# Patient Record
Sex: FEMALE | Race: BLACK OR AFRICAN AMERICAN | NOT HISPANIC OR LATINO | Employment: PART TIME | URBAN - METROPOLITAN AREA
[De-identification: names, ages, dates, MRNs, and addresses within clinical notes are randomized per-mention and may not be internally consistent; named-entity substitution may affect disease eponyms.]

---

## 2023-08-01 ENCOUNTER — HOSPITAL ENCOUNTER (OUTPATIENT)
Dept: DATA CONVERSION | Facility: HOSPITAL | Age: 58
End: 2023-08-01

## 2023-08-01 DIAGNOSIS — I20.1 ANGINA PECTORIS WITH DOCUMENTED SPASM (CMS-HCC): ICD-10-CM

## 2023-08-03 ENCOUNTER — HOSPITAL ENCOUNTER (OUTPATIENT)
Dept: DATA CONVERSION | Facility: HOSPITAL | Age: 58
End: 2023-08-03

## 2023-08-21 DIAGNOSIS — R07.89 OTHER CHEST PAIN: ICD-10-CM

## 2023-08-23 ENCOUNTER — HOSPITAL ENCOUNTER (OUTPATIENT)
Dept: DATA CONVERSION | Facility: HOSPITAL | Age: 58
End: 2023-08-23

## 2023-08-23 DIAGNOSIS — G47.33 OBSTRUCTIVE SLEEP APNEA (ADULT) (PEDIATRIC): ICD-10-CM

## 2023-08-31 ENCOUNTER — HOSPITAL ENCOUNTER (OUTPATIENT)
Dept: DATA CONVERSION | Facility: HOSPITAL | Age: 58
Discharge: HOME | End: 2023-08-31
Payer: COMMERCIAL

## 2023-08-31 DIAGNOSIS — R07.89 OTHER CHEST PAIN: ICD-10-CM

## 2023-09-23 PROBLEM — R00.2 PALPITATIONS: Status: ACTIVE | Noted: 2023-09-23

## 2023-09-23 PROBLEM — I10 HYPERTENSIVE DISORDER: Status: ACTIVE | Noted: 2023-09-23

## 2023-09-23 PROBLEM — J45.909 ASTHMA (HHS-HCC): Status: ACTIVE | Noted: 2023-09-23

## 2023-09-23 PROBLEM — R30.0 DYSURIA: Status: ACTIVE | Noted: 2023-09-23

## 2023-09-23 PROBLEM — T50.901A ACCIDENTAL DRUG OVERDOSE: Status: ACTIVE | Noted: 2023-09-23

## 2023-09-23 PROBLEM — R06.00 DYSPNEA: Status: ACTIVE | Noted: 2023-09-23

## 2023-09-23 PROBLEM — K57.92 DIVERTICULITIS: Status: ACTIVE | Noted: 2023-09-23

## 2023-09-23 PROBLEM — K29.70 GASTRITIS: Status: ACTIVE | Noted: 2023-09-23

## 2023-09-23 PROBLEM — R07.89 ATYPICAL CHEST PAIN: Status: ACTIVE | Noted: 2023-09-23

## 2023-09-23 PROBLEM — I20.1 VARIANT ANGINA (CMS-HCC): Status: ACTIVE | Noted: 2023-09-23

## 2023-09-23 PROBLEM — I10 ESSENTIAL HYPERTENSION: Status: ACTIVE | Noted: 2023-09-23

## 2023-09-23 PROBLEM — N95.2 ATROPHIC VAGINITIS: Status: ACTIVE | Noted: 2023-09-23

## 2023-09-23 PROBLEM — I48.0 PAROXYSMAL ATRIAL FIBRILLATION (MULTI): Status: ACTIVE | Noted: 2023-09-23

## 2023-09-23 RX ORDER — METOPROLOL SUCCINATE 100 MG/1
100 TABLET, EXTENDED RELEASE ORAL
COMMUNITY
Start: 2023-08-15 | End: 2023-12-13

## 2023-09-23 RX ORDER — FLECAINIDE ACETATE 50 MG/1
50 TABLET ORAL 2 TIMES DAILY
COMMUNITY
Start: 2023-08-15

## 2023-09-23 RX ORDER — FAMOTIDINE 20 MG/1
20 TABLET, FILM COATED ORAL 2 TIMES DAILY
COMMUNITY
Start: 2018-02-08

## 2023-09-23 RX ORDER — AMLODIPINE BESYLATE 5 MG/1
5 TABLET ORAL
COMMUNITY
Start: 2023-08-15 | End: 2023-12-13

## 2023-09-23 RX ORDER — ALBUTEROL SULFATE 90 UG/1
2 AEROSOL, METERED RESPIRATORY (INHALATION) EVERY 4 HOURS PRN
COMMUNITY
Start: 2023-08-15 | End: 2024-05-30 | Stop reason: ALTCHOICE

## 2023-09-23 RX ORDER — GABAPENTIN 300 MG/1
300 CAPSULE ORAL NIGHTLY
COMMUNITY
Start: 2023-08-15 | End: 2023-12-13

## 2023-09-23 RX ORDER — CYCLOBENZAPRINE HCL 10 MG
10 TABLET ORAL 3 TIMES DAILY PRN
COMMUNITY
Start: 2020-01-21

## 2023-09-23 RX ORDER — FLUTICASONE PROPIONATE 50 MCG
2 SPRAY, SUSPENSION (ML) NASAL DAILY
COMMUNITY
Start: 2023-08-15

## 2023-09-23 RX ORDER — HYDROCHLOROTHIAZIDE 12.5 MG/1
12.5 TABLET ORAL
COMMUNITY
Start: 2023-08-15 | End: 2023-11-13

## 2023-09-23 RX ORDER — TRAMADOL HYDROCHLORIDE 50 MG/1
50 TABLET ORAL EVERY 4 HOURS PRN
COMMUNITY

## 2023-09-23 RX ORDER — HYDROCHLOROTHIAZIDE 25 MG/1
1 TABLET ORAL EVERY MORNING
COMMUNITY

## 2023-09-23 RX ORDER — METOPROLOL TARTRATE 50 MG/1
50 TABLET ORAL 2 TIMES DAILY
COMMUNITY

## 2023-09-23 RX ORDER — DIPHENHYDRAMINE HCL 25 MG
1-2 CAPSULE ORAL 3 TIMES DAILY PRN
COMMUNITY
Start: 2018-02-08

## 2023-09-23 RX ORDER — VIT C/E/ZN/COPPR/LUTEIN/ZEAXAN 250MG-90MG
2000 CAPSULE ORAL
COMMUNITY
Start: 2023-08-15

## 2023-09-23 RX ORDER — METHOCARBAMOL 500 MG/1
500 TABLET, FILM COATED ORAL 2 TIMES DAILY PRN
COMMUNITY
Start: 2023-08-15

## 2024-01-03 ENCOUNTER — PHARMACY VISIT (OUTPATIENT)
Dept: PHARMACY | Facility: CLINIC | Age: 59
End: 2024-01-03
Payer: MEDICAID

## 2024-01-03 ENCOUNTER — APPOINTMENT (OUTPATIENT)
Dept: RADIOLOGY | Facility: HOSPITAL | Age: 59
End: 2024-01-03
Payer: MEDICAID

## 2024-01-03 ENCOUNTER — HOSPITAL ENCOUNTER (EMERGENCY)
Facility: HOSPITAL | Age: 59
Discharge: HOME | End: 2024-01-03
Attending: STUDENT IN AN ORGANIZED HEALTH CARE EDUCATION/TRAINING PROGRAM
Payer: MEDICAID

## 2024-01-03 ENCOUNTER — APPOINTMENT (OUTPATIENT)
Dept: CARDIOLOGY | Facility: HOSPITAL | Age: 59
End: 2024-01-03
Payer: MEDICAID

## 2024-01-03 VITALS
OXYGEN SATURATION: 100 % | DIASTOLIC BLOOD PRESSURE: 73 MMHG | HEIGHT: 71 IN | SYSTOLIC BLOOD PRESSURE: 161 MMHG | HEART RATE: 74 BPM | WEIGHT: 240.74 LBS | TEMPERATURE: 97.9 F | RESPIRATION RATE: 14 BRPM | BODY MASS INDEX: 33.7 KG/M2

## 2024-01-03 DIAGNOSIS — R09.81 NASAL CONGESTION: ICD-10-CM

## 2024-01-03 DIAGNOSIS — J32.9 VIRAL SINUSITIS: Primary | ICD-10-CM

## 2024-01-03 DIAGNOSIS — B97.89 VIRAL SINUSITIS: Primary | ICD-10-CM

## 2024-01-03 LAB
FLUAV RNA RESP QL NAA+PROBE: NOT DETECTED
FLUBV RNA RESP QL NAA+PROBE: NOT DETECTED
RSV RNA RESP QL NAA+PROBE: NOT DETECTED
SARS-COV-2 RNA RESP QL NAA+PROBE: NOT DETECTED

## 2024-01-03 PROCEDURE — 87637 SARSCOV2&INF A&B&RSV AMP PRB: CPT | Performed by: EMERGENCY MEDICINE

## 2024-01-03 PROCEDURE — 71046 X-RAY EXAM CHEST 2 VIEWS: CPT

## 2024-01-03 PROCEDURE — RXMED WILLOW AMBULATORY MEDICATION CHARGE

## 2024-01-03 PROCEDURE — 93005 ELECTROCARDIOGRAM TRACING: CPT

## 2024-01-03 PROCEDURE — 99283 EMERGENCY DEPT VISIT LOW MDM: CPT | Performed by: STUDENT IN AN ORGANIZED HEALTH CARE EDUCATION/TRAINING PROGRAM

## 2024-01-03 RX ORDER — GUAIFENESIN 600 MG/1
TABLET, EXTENDED RELEASE ORAL
Qty: 30 TABLET | Refills: 0 | OUTPATIENT
Start: 2024-01-03

## 2024-01-03 RX ORDER — GUAIFENESIN AND DEXTROMETHORPHAN HYDROBROMIDE 600; 30 MG/1; MG/1
TABLET, EXTENDED RELEASE ORAL
Qty: 30 TABLET | Refills: 0 | OUTPATIENT
Start: 2024-01-03 | End: 2024-01-03 | Stop reason: ENTERED-IN-ERROR

## 2024-01-03 RX ORDER — GUAIFENESIN 600 MG/1
1200 TABLET, EXTENDED RELEASE ORAL 2 TIMES DAILY
Qty: 30 TABLET | Refills: 0 | Status: SHIPPED | OUTPATIENT
Start: 2024-01-03 | End: 2024-01-11

## 2024-01-03 ASSESSMENT — COLUMBIA-SUICIDE SEVERITY RATING SCALE - C-SSRS
6. HAVE YOU EVER DONE ANYTHING, STARTED TO DO ANYTHING, OR PREPARED TO DO ANYTHING TO END YOUR LIFE?: NO
1. IN THE PAST MONTH, HAVE YOU WISHED YOU WERE DEAD OR WISHED YOU COULD GO TO SLEEP AND NOT WAKE UP?: NO
2. HAVE YOU ACTUALLY HAD ANY THOUGHTS OF KILLING YOURSELF?: NO

## 2024-01-03 ASSESSMENT — PAIN - FUNCTIONAL ASSESSMENT: PAIN_FUNCTIONAL_ASSESSMENT: 0-10

## 2024-01-03 NOTE — DISCHARGE INSTRUCTIONS
Take over-the-counter medications as needed for symptom relief.  Use Mucinex as prescribed.  Do not take multiple decongestants together, as this may make congestion worse.

## 2024-01-03 NOTE — Clinical Note
Lili Doe was seen and treated in our emergency department on 1/3/2024.  She may return to work on 01/08/2024.  Patient presented to the emergency department today with illness.  I believe rest and hydration will help this patient's symptoms and a return to work on Monday 1/8 would be acceptable.     If you have any questions or concerns, please don't hesitate to call.      Ron Vera PA-C

## 2024-01-03 NOTE — ED PROVIDER NOTES
HPI   Chief Complaint   Patient presents with    Flu Symptoms     Flu sx since Thursday.        Patient is a 58-year-old female past medical history of atrial fibrillation, asthma, hypertension presenting with cold-like symptoms.  Symptoms started last Thursday.  She endorses head congestion, nasal congestion, mild intermittent cough that is not productive.  Patient endorses musculoskeletal chest pain from the cough as well as mild shortness of breath during coughing fits.  Patient denies having taken any over-the-counter medication to help with her symptoms.  She does endorse having a sick child currently as well as sick contacts at work.  She denies fever, chills, abdominal pain, nausea, vomiting, or diarrhea.                           [place] Coma Scale Score: 15                  Patient History   Past Medical History:   Diagnosis Date    Asthma     Atrial fibrillation (CMS/[facility])     Hypertension      No past surgical history on file.  Family History   Problem Relation Name Age of Onset    Stroke Mother      No Known Problems Father       Social History     Tobacco Use    Smoking status: Not on file    Smokeless tobacco: Not on file   Substance Use Topics    Alcohol use: Not on file    Drug use: Not on file       Physical Exam   ED Triage Vitals [01/03/24 1115]   Temp Heart Rate Resp BP   36.6 °C (97.9 °F) 74 14 161/73      SpO2 Temp src Heart Rate Source Patient Position   100 % -- -- --      BP Location FiO2 (%)     -- --       Physical Exam  Constitutional:       Appearance: Normal appearance.      Comments: Patient is sitting in chair, in no acute distress   HENT:      Head: Normocephalic and atraumatic.      Comments: Nontender to palpation of frontal and maxillary sinuses     Nose: Congestion present.      Mouth/Throat:      Mouth: Mucous membranes are moist.      Pharynx: Oropharynx is clear.   Eyes:      Extraocular Movements: Extraocular movements intact.      Pupils: Pupils are equal, round, and reactive  to light.   Cardiovascular:      Rate and Rhythm: Normal rate and regular rhythm.      Pulses: Normal pulses.      Heart sounds: Normal heart sounds.   Pulmonary:      Effort: Pulmonary effort is normal.      Breath sounds: Normal breath sounds.   Abdominal:      General: Abdomen is flat.      Palpations: Abdomen is soft.   Musculoskeletal:      Cervical back: Normal range of motion and neck supple.   Skin:     General: Skin is warm and dry.   Neurological:      General: No focal deficit present.      Mental Status: She is alert and oriented to person, place, and time.   Psychiatric:         Mood and Affect: Mood normal.         Behavior: Behavior normal.         ED Course & MDM   ED Course as of 01/03/24 1316   Wed Jan 03, 2024   1129 NSR, rate 69, no ectopy, no stttw changes, no stemi [JH]      ED Course User Index  [JH] Omar Campbell MD         Diagnoses as of 01/03/24 1316   Viral sinusitis   Nasal congestion       Medical Decision Making  Patient is a 58-year-old female with past medical history of hypertension, atrial fibrillation, asthma presenting with cold-like symptoms.  Conditions considered include but are not limited to: COVID, influenza, RSV, sinusitis, other upper respiratory infection, pneumonia, PE.  Due to patient's chief complaint, COVID/influenza/RSV swabs ordered.  Chest x-ray ordered.  COVID/influenza/RSV swabs are all negative.  Chest x-ray shows no acute cardiopulmonary process.  I believe this patient is at low risk for complication, and a disposition of discharge is acceptable.  We discussed returning to the emergency department if new or worsening symptoms including intractable fever, chest pain, shortness of breath, abdominal pain, nausea, vomiting, diarrhea.  Patient states that she has taken Mucinex in the past which helps to clear her congestion.  A prescription for Mucinex was sent.  We discussed over-the-counter antipyretics and pain relief as needed.  Also discussed taking  decongestants however do not mix decongestants with Mucinex as this may worsen congestion.  Patient agreeable to discharge with a follow-up with her primary care as well as discharge with prescription for Mucinex.    Portions of this note made with Dragon software, please be mindful of potential grammatical errors.        Labs Reviewed   SARS-COV-2 AND INFLUENZA A/B PCR - Normal       Result Value    Flu A Result Not Detected      Flu B Result Not Detected      Coronavirus 2019, PCR Not Detected      Narrative:     This assay has received FDA Emergency Use Authorization (EUA) and  is only authorized for the duration of time that circumstances exist to justify the authorization of the emergency use of in vitro diagnostic tests for the detection of SARS-CoV-2 virus and/or diagnosis of COVID-19 infection under section 564(b)(1) of the Act, 21 U.S.C. 360bbb-3(b)(1). Testing for SARS-CoV-2 is only recommended for patients who meet current clinical and/or epidemiological criteria as defined by federal, state, or local public health directives. This assay is an in vitro diagnostic nucleic acid amplification test for the qualitative detection of SARS-CoV-2, Influenza A, and Influenza B from nasopharyngeal specimens and has been validated for use at [facility]. Negative results do not preclude COVID-19 infections or Influenza A/B infections, and should not be used as the sole basis for diagnosis, treatment, or other management decisions. If Influenza A/B and RSV PCR results are negative, testing for Parainfluenza virus, Adenovirus and Metapneumovirus is routinely performed for [facility] pediatric oncology and intensive care inpatients, and is available on other patients by placing an add-on request.    RSV PCR - Normal    RSV PCR Not Detected      Narrative:     This assay is an FDA-cleared, in vitro diagnostic nucleic acid amplification test for the detection of RSV from nasopharyngeal specimens, and has  been validated for use at [facility]. Negative results do not preclude RSV infections, and should not be used as the sole basis for diagnosis, treatment, or other management decisions. If Influenza A/B and RSV PCR results are negative, testing for Parainfluenza virus, Adenovirus and Metapneumovirus is routinely performed for pediatric oncology and intensive care inpatients at [facility], and is available on other patients by placing an add-on request.             XR chest 2 views   Final Result   No acute cardiopulmonary process.                  Signed by: Louis Aldana 1/3/2024 11:57 AM   Dictation workstation:   [id number]            Procedure  Procedures     Ron Vera PA-C  01/03/24 3288

## 2024-01-04 PROCEDURE — RXMED WILLOW AMBULATORY MEDICATION CHARGE

## 2024-01-07 LAB
ATRIAL RATE: 69 BPM
P AXIS: 74 DEGREES
P OFFSET: 198 MS
P ONSET: 134 MS
PR INTERVAL: 170 MS
Q ONSET: 219 MS
QRS COUNT: 11 BEATS
QRS DURATION: 82 MS
QT INTERVAL: 394 MS
QTC CALCULATION(BAZETT): 422 MS
QTC FREDERICIA: 413 MS
R AXIS: 52 DEGREES
T AXIS: 57 DEGREES
T OFFSET: 416 MS
VENTRICULAR RATE: 69 BPM

## 2024-01-12 PROCEDURE — RXMED WILLOW AMBULATORY MEDICATION CHARGE

## 2024-01-15 ENCOUNTER — PHARMACY VISIT (OUTPATIENT)
Dept: PHARMACY | Facility: CLINIC | Age: 59
End: 2024-01-15
Payer: MEDICAID

## 2024-01-19 PROCEDURE — RXMED WILLOW AMBULATORY MEDICATION CHARGE

## 2024-01-22 ENCOUNTER — PHARMACY VISIT (OUTPATIENT)
Dept: PHARMACY | Facility: CLINIC | Age: 59
End: 2024-01-22
Payer: MEDICAID

## 2024-02-08 PROCEDURE — RXMED WILLOW AMBULATORY MEDICATION CHARGE

## 2024-02-14 ENCOUNTER — PHARMACY VISIT (OUTPATIENT)
Dept: PHARMACY | Facility: CLINIC | Age: 59
End: 2024-02-14
Payer: MEDICAID

## 2024-02-15 PROCEDURE — RXMED WILLOW AMBULATORY MEDICATION CHARGE

## 2024-02-23 ENCOUNTER — PHARMACY VISIT (OUTPATIENT)
Dept: PHARMACY | Facility: CLINIC | Age: 59
End: 2024-02-23
Payer: MEDICAID

## 2024-02-29 PROCEDURE — RXMED WILLOW AMBULATORY MEDICATION CHARGE

## 2024-03-01 ENCOUNTER — PHARMACY VISIT (OUTPATIENT)
Dept: PHARMACY | Facility: CLINIC | Age: 59
End: 2024-03-01
Payer: MEDICAID

## 2024-03-17 RX ORDER — GABAPENTIN 300 MG/1
300 CAPSULE ORAL NIGHTLY
Qty: 90 CAPSULE | Refills: 0 | Status: CANCELLED | OUTPATIENT
Start: 2024-03-17

## 2024-03-18 PROCEDURE — RXMED WILLOW AMBULATORY MEDICATION CHARGE

## 2024-03-19 PROCEDURE — RXMED WILLOW AMBULATORY MEDICATION CHARGE

## 2024-03-25 ENCOUNTER — PHARMACY VISIT (OUTPATIENT)
Dept: PHARMACY | Facility: CLINIC | Age: 59
End: 2024-03-25
Payer: MEDICAID

## 2024-03-26 ENCOUNTER — APPOINTMENT (OUTPATIENT)
Dept: RADIOLOGY | Facility: HOSPITAL | Age: 59
End: 2024-03-26
Payer: MEDICAID

## 2024-03-26 ENCOUNTER — HOSPITAL ENCOUNTER (EMERGENCY)
Facility: HOSPITAL | Age: 59
Discharge: HOME | End: 2024-03-26
Payer: MEDICAID

## 2024-03-26 VITALS
HEIGHT: 71 IN | DIASTOLIC BLOOD PRESSURE: 74 MMHG | TEMPERATURE: 97.7 F | SYSTOLIC BLOOD PRESSURE: 155 MMHG | RESPIRATION RATE: 18 BRPM | OXYGEN SATURATION: 99 % | WEIGHT: 230 LBS | HEART RATE: 77 BPM | BODY MASS INDEX: 32.2 KG/M2

## 2024-03-26 DIAGNOSIS — S16.1XXA CERVICAL STRAIN, ACUTE, INITIAL ENCOUNTER: Primary | ICD-10-CM

## 2024-03-26 PROCEDURE — 96372 THER/PROPH/DIAG INJ SC/IM: CPT

## 2024-03-26 PROCEDURE — 72125 CT NECK SPINE W/O DYE: CPT

## 2024-03-26 PROCEDURE — 2500000004 HC RX 250 GENERAL PHARMACY W/ HCPCS (ALT 636 FOR OP/ED)

## 2024-03-26 PROCEDURE — 2500000005 HC RX 250 GENERAL PHARMACY W/O HCPCS

## 2024-03-26 PROCEDURE — 99284 EMERGENCY DEPT VISIT MOD MDM: CPT | Mod: 25

## 2024-03-26 PROCEDURE — 72125 CT NECK SPINE W/O DYE: CPT | Performed by: RADIOLOGY

## 2024-03-26 RX ORDER — ACETAMINOPHEN 325 MG/1
650 TABLET ORAL ONCE
Status: COMPLETED | OUTPATIENT
Start: 2024-03-26 | End: 2024-03-26

## 2024-03-26 RX ORDER — LIDOCAINE 560 MG/1
1 PATCH PERCUTANEOUS; TOPICAL; TRANSDERMAL ONCE
Status: DISCONTINUED | OUTPATIENT
Start: 2024-03-26 | End: 2024-03-26 | Stop reason: HOSPADM

## 2024-03-26 RX ORDER — LIDOCAINE 50 MG/G
1 PATCH TOPICAL DAILY
Qty: 21 PATCH | Refills: 0 | Status: SHIPPED | OUTPATIENT
Start: 2024-03-26

## 2024-03-26 RX ORDER — CYCLOBENZAPRINE HCL 10 MG
10 TABLET ORAL 2 TIMES DAILY PRN
Qty: 14 TABLET | Refills: 0 | Status: SHIPPED | OUTPATIENT
Start: 2024-03-26 | End: 2024-04-05

## 2024-03-26 RX ORDER — KETOROLAC TROMETHAMINE 30 MG/ML
15 INJECTION, SOLUTION INTRAMUSCULAR; INTRAVENOUS ONCE
Status: COMPLETED | OUTPATIENT
Start: 2024-03-26 | End: 2024-03-26

## 2024-03-26 RX ORDER — ORPHENADRINE CITRATE 30 MG/ML
60 INJECTION INTRAMUSCULAR; INTRAVENOUS ONCE
Status: COMPLETED | OUTPATIENT
Start: 2024-03-26 | End: 2024-03-26

## 2024-03-26 RX ADMIN — KETOROLAC TROMETHAMINE 15 MG: 30 INJECTION, SOLUTION INTRAMUSCULAR at 18:44

## 2024-03-26 RX ADMIN — LIDOCAINE 1 PATCH: 4 PATCH TOPICAL at 18:44

## 2024-03-26 RX ADMIN — ACETAMINOPHEN 650 MG: 325 TABLET ORAL at 18:45

## 2024-03-26 RX ADMIN — ORPHENADRINE CITRATE 60 MG: 60 INJECTION INTRAMUSCULAR; INTRAVENOUS at 18:44

## 2024-03-26 ASSESSMENT — COLUMBIA-SUICIDE SEVERITY RATING SCALE - C-SSRS
2. HAVE YOU ACTUALLY HAD ANY THOUGHTS OF KILLING YOURSELF?: NO
1. IN THE PAST MONTH, HAVE YOU WISHED YOU WERE DEAD OR WISHED YOU COULD GO TO SLEEP AND NOT WAKE UP?: NO
6. HAVE YOU EVER DONE ANYTHING, STARTED TO DO ANYTHING, OR PREPARED TO DO ANYTHING TO END YOUR LIFE?: NO

## 2024-03-26 NOTE — ED PROVIDER NOTES
HPI   Chief Complaint   Patient presents with    Neck Pain     Pt complains of neck pain that started yesterday. No trauma       Patient is a 58-year-old female presents emergency department for evaluation of right-sided neck pain.  Patient states that she has a history of neck issues that she has a lot of degenerative disease and pain in the neck.  She states that she follows with her primary care provider for this has never seen orthopedic spine specialist.  She states that yesterday she was having some pain on the left side of her neck and she took some cyclobenzaprine and Tylenol which seemed to help.  She states that today when she woke up this morning she felt stiff and tight in the right side of her neck.  She states that it is hard to look to her right because of the tightness and pain.  She denies any fevers, chills, nausea, vomiting, sore throat, chest pain, shortness of breath, or other symptoms at this time.  She denies any injury or trauma that she is aware of..      History provided by:  Patient   used: No                        [place] Coma Scale Score: 15                     Patient History   Past Medical History:   Diagnosis Date    Asthma     Atrial fibrillation (CMS/HCC)     Hypertension      No past surgical history on file.  Family History   Problem Relation Name Age of Onset    Stroke Mother      No Known Problems Father       Social History     Tobacco Use    Smoking status: Not on file    Smokeless tobacco: Not on file   Substance Use Topics    Alcohol use: Not on file    Drug use: Not on file       Physical Exam   ED Triage Vitals [03/26/24 1804]   Temperature Heart Rate Respirations BP   36.5 °C (97.7 °F) 72 18 167/71      Pulse Ox Temp Source Heart Rate Source Patient Position   100 % Temporal -- Sitting      BP Location FiO2 (%)     Left arm --       Physical Exam  Constitutional:       Appearance: Normal appearance.   Neck:      Comments: Negative Kernig's and  negative Brudzinski's.  Some paraspinal muscle tenderness to palpation with decreased range of motion to the right due to tightness.  Cardiovascular:      Rate and Rhythm: Normal rate and regular rhythm.   Pulmonary:      Effort: Pulmonary effort is normal.      Breath sounds: Normal breath sounds.   Musculoskeletal:         General: Normal range of motion.      Cervical back: Neck supple. Tenderness present. No rigidity.      Comments: Bilateral upper extremities neurovascular intact with good strength and reflexes.   Skin:     General: Skin is warm and dry.   Neurological:      General: No focal deficit present.      Mental Status: She is alert and oriented to person, place, and time.         ED Course & MDM   Diagnoses as of 03/27/24 1423   Cervical strain, acute, initial encounter       Medical Decision Making  Patient is a 58-year-old female presents emergency department for nontraumatic right-sided neck pain starting this morning.    Lab work not warranted at today's visit.      Scans done today were interpreted/confirmed by radiologist and also interpreted by me which included CT cervical spine without contrast.  The scan shows degenerative disc disease and spondylosis as described in the body of the report with no acute fracture or spondylolisthesis.    Medications given at today's visit include PO Tylenol, IM Toradol, Lidoderm patch, IM Norflex    I saw this patient independently.  Patient feeling mildly improved medication given emergency department.  She is afebrile and well-appearing.  Patient has symptoms was consistent with cervical strain and muscular tightness.  No meningeal signs and no indication for further imaging or lab work at this time.  She does have extensive history of degenerative disease of the neck which may be contributing.  Patient will be placed on muscle relaxer and Lidoderm patches and educated to continue gentle range of motion stretching along with Tylenol and ibuprofen and close  follow-up with primary care provider.  She did request referral and information for spine specialist as she is never seen one previously.  Emergent pathologies were considered for this patient, although I have low suspicion for anything acutely emergent given patient's clinical presentation, history, physical exam, stable vital signs, and relatively unremarkable workup.  Discharging patient home is reasonable plan of care for outpatient management.    All labs, imaging, and diagnostic studies were reviewed by me and patient was counseled on clinical impression, expectations, and plan.  Patient was educated to follow-up with PCP in the following 1-2 days.  All questions from patient were answered. They elicited understanding and were agreeable to course of treatment.  Patient was discharged in stable condition and given strict return precautions.    Prescriptions given on discharge: PO Flexeril    ** Disclaimer:  Parts of this document were written utilizing a voice to text dictation software.  Note may contain minor transcription or typographical errors that were inadvertently transcribed by the computer software.        Procedure  Procedures     Preethi Payton PA-C  03/27/24 1424

## 2024-03-26 NOTE — DISCHARGE INSTRUCTIONS
Please follow-up closely with primary care provider in the following 1 to 2 days.    Continue Tylenol and ibuprofen along with muscle relaxer to help with symptoms.  Additionally can use lidocaine patches to help with symptoms.    Referral given to Orthospine specialist should you need it.

## 2024-04-03 PROCEDURE — 88175 CYTOPATH C/V AUTO FLUID REDO: CPT

## 2024-04-03 PROCEDURE — 87624 HPV HI-RISK TYP POOLED RSLT: CPT

## 2024-04-04 ENCOUNTER — LAB REQUISITION (OUTPATIENT)
Dept: LAB | Facility: HOSPITAL | Age: 59
End: 2024-04-04
Payer: MEDICAID

## 2024-04-04 DIAGNOSIS — Z01.419 ENCOUNTER FOR GYNECOLOGICAL EXAMINATION (GENERAL) (ROUTINE) WITHOUT ABNORMAL FINDINGS: ICD-10-CM

## 2024-04-04 DIAGNOSIS — Z11.51 ENCOUNTER FOR SCREENING FOR HUMAN PAPILLOMAVIRUS (HPV): ICD-10-CM

## 2024-04-12 PROCEDURE — RXMED WILLOW AMBULATORY MEDICATION CHARGE

## 2024-04-12 RX ORDER — GABAPENTIN 300 MG/1
300 CAPSULE ORAL NIGHTLY
Qty: 90 CAPSULE | Refills: 0 | Status: CANCELLED | OUTPATIENT
Start: 2024-04-12

## 2024-04-15 PROCEDURE — RXMED WILLOW AMBULATORY MEDICATION CHARGE

## 2024-04-17 RX ORDER — GABAPENTIN 300 MG/1
300 CAPSULE ORAL NIGHTLY
Qty: 90 CAPSULE | Refills: 0 | Status: CANCELLED | OUTPATIENT
Start: 2024-04-12

## 2024-04-18 PROCEDURE — RXMED WILLOW AMBULATORY MEDICATION CHARGE

## 2024-04-23 ENCOUNTER — PHARMACY VISIT (OUTPATIENT)
Dept: PHARMACY | Facility: CLINIC | Age: 59
End: 2024-04-23
Payer: MEDICAID

## 2024-04-30 ENCOUNTER — PHARMACY VISIT (OUTPATIENT)
Dept: PHARMACY | Facility: CLINIC | Age: 59
End: 2024-04-30
Payer: MEDICAID

## 2024-04-30 PROCEDURE — RXMED WILLOW AMBULATORY MEDICATION CHARGE

## 2024-04-30 RX ORDER — CLOBETASOL PROPIONATE 0.5 MG/G
CREAM TOPICAL
Qty: 60 G | Refills: 1 | OUTPATIENT
Start: 2024-04-30

## 2024-04-30 RX ORDER — CETIRIZINE HYDROCHLORIDE 10 MG/1
TABLET ORAL
Qty: 30 TABLET | Refills: 0 | OUTPATIENT
Start: 2024-04-30 | End: 2024-05-24 | Stop reason: SDUPTHER

## 2024-05-07 PROCEDURE — RXMED WILLOW AMBULATORY MEDICATION CHARGE

## 2024-05-13 PROCEDURE — RXMED WILLOW AMBULATORY MEDICATION CHARGE

## 2024-05-14 ENCOUNTER — APPOINTMENT (OUTPATIENT)
Dept: RADIOLOGY | Facility: HOSPITAL | Age: 59
End: 2024-05-14
Payer: MEDICAID

## 2024-05-14 ENCOUNTER — HOSPITAL ENCOUNTER (EMERGENCY)
Facility: HOSPITAL | Age: 59
Discharge: HOME | End: 2024-05-14
Payer: MEDICAID

## 2024-05-14 ENCOUNTER — PHARMACY VISIT (OUTPATIENT)
Dept: PHARMACY | Facility: CLINIC | Age: 59
End: 2024-05-14
Payer: MEDICAID

## 2024-05-14 VITALS
DIASTOLIC BLOOD PRESSURE: 62 MMHG | BODY MASS INDEX: 33.6 KG/M2 | HEART RATE: 77 BPM | TEMPERATURE: 98.4 F | OXYGEN SATURATION: 99 % | RESPIRATION RATE: 14 BRPM | HEIGHT: 71 IN | WEIGHT: 240 LBS | SYSTOLIC BLOOD PRESSURE: 123 MMHG

## 2024-05-14 DIAGNOSIS — M25.561 ACUTE PAIN OF RIGHT KNEE: Primary | ICD-10-CM

## 2024-05-14 PROCEDURE — 73560 X-RAY EXAM OF KNEE 1 OR 2: CPT | Mod: RIGHT SIDE | Performed by: RADIOLOGY

## 2024-05-14 PROCEDURE — 73560 X-RAY EXAM OF KNEE 1 OR 2: CPT | Mod: RT

## 2024-05-14 PROCEDURE — 99283 EMERGENCY DEPT VISIT LOW MDM: CPT

## 2024-05-14 ASSESSMENT — PAIN DESCRIPTION - PROGRESSION: CLINICAL_PROGRESSION: NOT CHANGED

## 2024-05-14 ASSESSMENT — PAIN DESCRIPTION - FREQUENCY: FREQUENCY: CONSTANT/CONTINUOUS

## 2024-05-14 ASSESSMENT — PAIN DESCRIPTION - ORIENTATION: ORIENTATION: RIGHT

## 2024-05-14 ASSESSMENT — COLUMBIA-SUICIDE SEVERITY RATING SCALE - C-SSRS
6. HAVE YOU EVER DONE ANYTHING, STARTED TO DO ANYTHING, OR PREPARED TO DO ANYTHING TO END YOUR LIFE?: NO
2. HAVE YOU ACTUALLY HAD ANY THOUGHTS OF KILLING YOURSELF?: NO
1. IN THE PAST MONTH, HAVE YOU WISHED YOU WERE DEAD OR WISHED YOU COULD GO TO SLEEP AND NOT WAKE UP?: NO

## 2024-05-14 ASSESSMENT — PAIN SCALES - GENERAL: PAINLEVEL_OUTOF10: 7

## 2024-05-14 ASSESSMENT — PAIN - FUNCTIONAL ASSESSMENT: PAIN_FUNCTIONAL_ASSESSMENT: 0-10

## 2024-05-14 ASSESSMENT — PAIN DESCRIPTION - LOCATION: LOCATION: KNEE

## 2024-05-14 ASSESSMENT — PAIN DESCRIPTION - ONSET: ONSET: AWAKENED FROM SLEEP

## 2024-05-14 NOTE — ED PROVIDER NOTES
HPI   Chief Complaint   Patient presents with    Joint Swelling     Right knee swelling started a week ago Sunday        HPI  See my MDM                  [place] Coma Scale Score: 15                     Patient History   Past Medical History:   Diagnosis Date    Asthma ([facility]-HCC)     Atrial fibrillation (Multi)     Hypertension      No past surgical history on file.  Family History   Problem Relation Name Age of Onset    Stroke Mother      No Known Problems Father       Social History     Tobacco Use    Smoking status: Not on file    Smokeless tobacco: Not on file   Substance Use Topics    Alcohol use: Not on file    Drug use: Not on file       Physical Exam   ED Triage Vitals [05/14/24 1532]   Temperature Heart Rate Respirations BP   36.9 °C (98.4 °F) 77 14 123/62      Pulse Ox Temp src Heart Rate Source Patient Position   99 % -- -- --      BP Location FiO2 (%)     -- --       Physical Exam  CONSTITUTIONAL: Vital signs reviewed as charted, well-developed and in no distress  LUNGS: Breath sounds equal and clear to auscultation. Good air exchange, no wheezes rales or retractions, pulse oximetry is charted.  HEART: Regular rate and rhythm without murmur thrill or rub, strong tones, auscultation is normal.  ABDOMEN: Soft and nontender without guarding rebound rigidity or mass. Bowel sounds are present and normal in all quadrants. There is no palpable masses or aneurysms identified. No hepatosplenomegaly, normal abdominal exam.  Neuro: The patient is awake, alert and oriented ×3. Moving all 4 extremities and answering questions appropriately.   MUSCULOSKELETAL: Exam of the right knee shows edema, no ecchymosis or obvious deformity minimal tenderness over the anterior joint lines.  Sensation pulses are intact distally.  PSYCH: Awake alert oriented, normal mood and affect.  Skin:  Dry, normal color, warm to the touch, no rash present.      ED Course & MDM   Diagnoses as of 05/14/24 1620   Acute pain of right knee  "      Medical Decision Making  History obtained from: patient    Vital signs, nursing notes, current medications, past medical history, Surgical history, allergies, social history, family History were reviewed.         HPI:  Patient 58-year-old female presenting emergency room today complaining of right knee pain.  States ongoing for 8 days.  Unknown injury.  States she has had swelling.  States no improvement but also no worsening.  Able to ambulate with pain but without limping.  Denies dizziness, chest pain, shortness of breath, abdominal pain extremity edema she is nontoxic and well-appearing her vital signs within normal limits.      10 point ROS was reviewed and negative except Noted above in HPI.  DDX: as listed above          MDM Summary/considerations:  EMERGENCY DEPARTMENT COURSE and DIFFERENTIAL DIAGNOSIS/MDM:    The patient presented with a chief complaint of right knee swelling. The differential diagnosis associated with this patient's presentation includes joint effusion, internal derangement, sprain or strain, fracture.     Vitals:    Vitals:    05/14/24 1532   BP: 123/62   Pulse: 77   Resp: 14   Temp: 36.9 °C (98.4 °F)   SpO2: 99%   Weight: 109 kg (240 lb)   Height: 1.803 m (5' 11\")       Diagnoses as of 05/14/24 1620   Acute pain of right knee       History Limited by:    None    Independent history obtained from:    None    External records reviewed:    None    Diagnostics interpreted by me:    Xray(s) right knee    Discussions with other clinicians:    None    Chronic conditions impacting care:    None    Social determinants of health affecting care:    None    Diagnostic tests considered but not performed: none    ED Medications managed:    Medications - No data to display    Prescription drugs considered:    None    Screenings:        Labs Reviewed - No data to display  XR knee right 1-2 views   Final Result   No evidence for osseous abnormality of the right knee.        Signed by: Derek" Shaq 5/14/2024 4:05 PM   Dictation workstation:   [id number]        Medications - No data to display  New Prescriptions    No medications on file     I estimate there is LOW risk for COMPARTMENT SYNDROME, DEEP VENOUS THROMBOSIS, SEPTIC ARTHRITIS, TENDON OR NEUROVASCULAR INJURY, thus I consider the discharge disposition reasonable. We have discussed the diagnosis and risks, and we agree with discharging home to follow-up with their primary doctor or the referral orthopedist. We also discussed returning to the Emergency Department immediately if new or worsening symptoms occur. We have discussed the symptoms which aremost concerning (e.g., changing or worsening pain, numbness, weakness) that necessitates immediate return.    X-ray shows no gross bony abnormality.  Patient will be discharged home instructed on rest ice compression elevation, orthopedic referral given.  She was discharged home in stable condition.    All of the patient's questions were answered to the best of my ability.  Patient states understanding that they have been screened for an emergency today and we have not found any etiology of symptoms that requires emergent treatment or admission to the hospital at this point. They understand that they have not had definitive care day and require follow-up for treatment of their condition. They also state understanding that they may have an emergent condition that may potentially have not of detected at this visit and they must return to the emergency department if they develop any worsening of symptoms or new complaints.      I have evaluated this patient, my supervising physician was available for consultation.              Critical Care: Not warranted at this time        This chart was completed using voice recognition transcription software. Please excuse any errors of transcription including grammatical, punctuation, syntax and spelling errors.  Please contact me with any questions regarding this  chart.    Procedure  Procedures     Stanley Peres, NINI-CNP  05/14/24 1584

## 2024-05-16 PROBLEM — D25.1 INTRAMURAL AND SUBSEROUS LEIOMYOMA OF UTERUS: Status: ACTIVE | Noted: 2018-09-18

## 2024-05-16 PROBLEM — G47.33 OBSTRUCTIVE SLEEP APNEA: Status: ACTIVE | Noted: 2023-09-12

## 2024-05-16 PROBLEM — R42 LIGHTHEADEDNESS: Status: ACTIVE | Noted: 2022-08-31

## 2024-05-16 PROBLEM — R53.81 MALAISE AND FATIGUE: Status: ACTIVE | Noted: 2018-11-26

## 2024-05-16 PROBLEM — R63.5 ABNORMAL WEIGHT GAIN: Status: ACTIVE | Noted: 2018-11-26

## 2024-05-16 PROBLEM — T50.901A ACCIDENTAL DRUG OVERDOSE: Status: ACTIVE | Noted: 2022-11-04

## 2024-05-16 PROBLEM — R94.31 ABNORMAL ELECTROCARDIOGRAM (ECG) (EKG): Status: ACTIVE | Noted: 2018-02-09

## 2024-05-16 PROBLEM — J20.9 ACUTE BRONCHITIS: Status: ACTIVE | Noted: 2024-05-16

## 2024-05-16 PROBLEM — S00.451A: Status: ACTIVE | Noted: 2023-06-11

## 2024-05-16 PROBLEM — D25.2 INTRAMURAL AND SUBSEROUS LEIOMYOMA OF UTERUS: Status: ACTIVE | Noted: 2018-09-18

## 2024-05-16 PROBLEM — R53.83 MALAISE AND FATIGUE: Status: ACTIVE | Noted: 2018-11-26

## 2024-05-16 PROBLEM — E66.3 OVERWEIGHT: Status: ACTIVE | Noted: 2018-02-09

## 2024-05-16 PROBLEM — R92.30 DENSE BREAST TISSUE ON MAMMOGRAM: Status: ACTIVE | Noted: 2018-01-11

## 2024-05-16 PROBLEM — M79.603 PAIN IN UPPER LIMB: Status: ACTIVE | Noted: 2023-05-08

## 2024-05-16 PROBLEM — R05.9 COUGH: Status: ACTIVE | Noted: 2024-05-16

## 2024-05-16 PROBLEM — S16.1XXA STRAIN OF NECK MUSCLE: Status: ACTIVE | Noted: 2024-05-16

## 2024-05-16 PROBLEM — R06.02 SOB (SHORTNESS OF BREATH) ON EXERTION: Status: ACTIVE | Noted: 2023-09-23

## 2024-05-16 PROCEDURE — RXMED WILLOW AMBULATORY MEDICATION CHARGE

## 2024-05-18 PROCEDURE — RXMED WILLOW AMBULATORY MEDICATION CHARGE

## 2024-05-23 PROCEDURE — RXMED WILLOW AMBULATORY MEDICATION CHARGE

## 2024-05-24 PROCEDURE — RXMED WILLOW AMBULATORY MEDICATION CHARGE

## 2024-05-30 ENCOUNTER — OFFICE VISIT (OUTPATIENT)
Dept: ORTHOPEDIC SURGERY | Facility: CLINIC | Age: 59
End: 2024-05-30
Payer: MEDICAID

## 2024-05-30 ENCOUNTER — PHARMACY VISIT (OUTPATIENT)
Dept: PHARMACY | Facility: CLINIC | Age: 59
End: 2024-05-30
Payer: MEDICAID

## 2024-05-30 VITALS — WEIGHT: 240.3 LBS | BODY MASS INDEX: 33.52 KG/M2

## 2024-05-30 DIAGNOSIS — M62.838 MUSCLE SPASMS OF NECK: ICD-10-CM

## 2024-05-30 DIAGNOSIS — M47.22 CERVICAL RADICULOPATHY DUE TO DEGENERATIVE JOINT DISEASE OF SPINE: ICD-10-CM

## 2024-05-30 DIAGNOSIS — S83.8X1A SPRAIN OF OTHER LIGAMENT OF RIGHT KNEE, INITIAL ENCOUNTER: ICD-10-CM

## 2024-05-30 DIAGNOSIS — M25.561 RIGHT KNEE PAIN, UNSPECIFIED CHRONICITY: Primary | ICD-10-CM

## 2024-05-30 PROBLEM — S83.91XA SPRAIN OF RIGHT KNEE: Status: ACTIVE | Noted: 2024-05-30

## 2024-05-30 PROCEDURE — 99203 OFFICE O/P NEW LOW 30 MIN: CPT | Performed by: ORTHOPAEDIC SURGERY

## 2024-05-30 PROCEDURE — 99213 OFFICE O/P EST LOW 20 MIN: CPT | Performed by: ORTHOPAEDIC SURGERY

## 2024-05-30 ASSESSMENT — PAIN SCALES - GENERAL: PAINLEVEL_OUTOF10: 0 - NO PAIN

## 2024-05-30 ASSESSMENT — ENCOUNTER SYMPTOMS
DEPRESSION: 0
LOSS OF SENSATION IN FEET: 0
OCCASIONAL FEELINGS OF UNSTEADINESS: 0

## 2024-05-30 ASSESSMENT — PAIN - FUNCTIONAL ASSESSMENT: PAIN_FUNCTIONAL_ASSESSMENT: 0-10

## 2024-05-30 ASSESSMENT — PAIN DESCRIPTION - DESCRIPTORS: DESCRIPTORS: ACHING

## 2024-05-30 ASSESSMENT — PATIENT HEALTH QUESTIONNAIRE - PHQ9
SUM OF ALL RESPONSES TO PHQ9 QUESTIONS 1 AND 2: 0
2. FEELING DOWN, DEPRESSED OR HOPELESS: NOT AT ALL
1. LITTLE INTEREST OR PLEASURE IN DOING THINGS: NOT AT ALL

## 2024-05-30 ASSESSMENT — LIFESTYLE VARIABLES: TOTAL SCORE: 0

## 2024-05-30 NOTE — PROGRESS NOTES
Subjective      Chief Complaint   Patient presents with    Neck - Pain    Right Knee - Pain, Edema      HPI  This 58 year old patient presents today with  right knee pain. The patient states that this right knee pain has been present for the past several weeks. She noticed swelling at the right knee. The patient rates the knee pain as 8. The patient states that knee pain is worse with and aggravated by activity and bearing weight. The patient has tried ibuprofen and tylenol with no relief. She also complains of chronic cervical spine pain and loss of motion, both of which are worse with and aggravated by attempting to turn her neck.  The patient requests a discussion of further treatment options on examination today.     CARDIOLOGY:   Negative for chest pain, shortness of breath.   RESPIRATORY:   Negative for chest pain, shortness of breath.   MUSCULOSKELETAL:   See HPI for details.   NEUROLOGY:   Negative for tingling, numbness, weakness.    Objective    There were no vitals filed for this visit.    Knee Exam  Constitutional: Appears stated age. No apparent distress  Labored Breathing: No  Psychiatric: Normal mood and effect.   Neurological: alert and oriented x3  Skin: intact  MUSCULOSKELETAL: Neck: There is diffuse tenderness, pain and limitation with range of motion. Back: No tenderness. Straight leg test negative bilaterally. right knee: There is diffuse tenderness over the knee. diminished range of motion McMurrays equivocal.  Anterior drawer and lachmans are negative. There is not an effusion present. The knee is stable to valgus and varus stressing. The patient walks with a painful gait favoring the right knee while walking.    XR knee right 1-2 views listed below do not show any evidence of fracture or destruction.  Joint surface cartilage is relatively well-maintained.    Result Date: 5/14/2024  Interpreted By:  Derek New, STUDY: XR KNEE RIGHT 1-2 VIEWS; 5/14/2024 3:57 pm   INDICATION:  Signs/Symptoms:pain.   COMPARISON: None available.   ACCESSION NUMBER(S): [id number]   ORDERING CLINICIAN: ROE GREGORY   TECHNIQUE: Views: AP and lateral views of the right knee.   FINDINGS: There is no evidence for fracture or subluxation. Joint spaces appear adequately maintained. No bone lesion is noted. There is no evidence for joint effusion. No soft tissue abnormality is identified.       No evidence for osseous abnormality of the right knee.   Signed by: Derek New 5/14/2024 4:05 PM Dictation workstation:   [id number]     Ct of the cervical spine done on 3-  IMPRESSION:  1. No acute fracture or spondylolisthesis.  2. Degenerative disc disease and spondylosis as described in the body  of the report.  Reviewed the x-rays and imaging studies listed above with the patient in the office today.  There are no diagnoses linked to this encounter.   Assessment: Right knee sprain, neck sprain and spasm and osteoarthritis of neck    Plan: Options are discussed with the patient in detail. The patient is given a prescription for physical therapy to evaluate and treat with gentle strengthening and ROM exercises with modalities as needed. The patient is instructed regarding activity modification and risk for further injury with falling or trauma, ice, physician directed at home gentle strengthening and ROM exercises, and the appropriate use of Tylenol as needed for pain with its potential adverse reactions and side effects. The patient understands.  Return as needed please note that this report has been produced using speech recognition software.  It may contain errors related to grammar, punctuation or spelling.  Electronically signed, but not reviewed.  Thong Crouch MD

## 2024-05-30 NOTE — PATIENT INSTRUCTIONS
Thank you for coming to see us today!     We are going to give you a referral for physical therapy     Please follow up as needed

## 2024-05-31 ENCOUNTER — APPOINTMENT (OUTPATIENT)
Dept: PHYSICAL THERAPY | Facility: CLINIC | Age: 59
End: 2024-05-31
Payer: MEDICAID

## 2024-06-04 PROCEDURE — RXMED WILLOW AMBULATORY MEDICATION CHARGE

## 2024-06-05 ENCOUNTER — PHARMACY VISIT (OUTPATIENT)
Dept: PHARMACY | Facility: CLINIC | Age: 59
End: 2024-06-05
Payer: MEDICAID

## 2024-06-10 PROCEDURE — RXMED WILLOW AMBULATORY MEDICATION CHARGE

## 2024-06-18 ENCOUNTER — EVALUATION (OUTPATIENT)
Dept: PHYSICAL THERAPY | Facility: CLINIC | Age: 59
End: 2024-06-18
Payer: MEDICAID

## 2024-06-18 DIAGNOSIS — M25.551 RIGHT HIP PAIN: ICD-10-CM

## 2024-06-18 DIAGNOSIS — S83.8X1A SPRAIN OF OTHER LIGAMENT OF RIGHT KNEE, INITIAL ENCOUNTER: ICD-10-CM

## 2024-06-18 DIAGNOSIS — M62.838 MUSCLE SPASMS OF NECK: ICD-10-CM

## 2024-06-18 DIAGNOSIS — M25.561 RIGHT KNEE PAIN, UNSPECIFIED CHRONICITY: Primary | ICD-10-CM

## 2024-06-18 DIAGNOSIS — M47.22 CERVICAL RADICULOPATHY DUE TO DEGENERATIVE JOINT DISEASE OF SPINE: ICD-10-CM

## 2024-06-18 PROCEDURE — 97161 PT EVAL LOW COMPLEX 20 MIN: CPT | Mod: GP | Performed by: PHYSICAL THERAPIST

## 2024-06-18 ASSESSMENT — PAIN SCALES - GENERAL: PAINLEVEL_OUTOF10: 0 - NO PAIN

## 2024-06-18 ASSESSMENT — PAIN - FUNCTIONAL ASSESSMENT: PAIN_FUNCTIONAL_ASSESSMENT: 0-10

## 2024-06-18 NOTE — PROGRESS NOTES
Physical Therapy Evaluation and Treatment      Patient Name: Lili Doe  MRN: [id number]  Today's Date: 6/18/2024  Time Calculation  Start Time: 1504  Stop Time: 1537  Time Calculation (min): 33 min  Low complexity due to patient's clinical presentation being stable and uncomplicated by any significant comorbidities that may affect rehab tolerance and progression.    Insurance:  Visit number: eval of 8 requested  Authorization info: EVAL ONLY - [facility] COMM PLAN MCAID - F/U REQ AUTH  Insurance Type: Payor: [employer] [facility] KILO / Plan: [employer] [facility] PLAN / Product Type: *No Product type* /     Current Problem:   1. Right knee pain, unspecified chronicity  Referral to Physical Therapy    Follow Up In Physical Therapy      2. Sprain of other ligament of right knee, initial encounter  Referral to Physical Therapy      3. Muscle spasms of neck  Referral to Physical Therapy      4. Cervical radiculopathy due to degenerative joint disease of spine  Referral to Physical Therapy      5. Right hip pain  Follow Up In Physical Therapy          Subjective    General:  General  Reason for Referral: R knee  Referred By: Dr. Crouch  General Comment: Pt noticed knee swelling a month ago. 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 Pt does have a PMH of a bakers cyst. Notes no pain, just swelling.  Precautions:  Precautions  Precautions Comment: has trouble laying supine due to slipped discs in L spine  Pain:  Pain Assessment  Pain Assessment: 0-10  Pain Score: 0 - No pain  Home Living:   no stairs at home or work   Prior Level of Function:  Prior Function Per Pt/Caregiver Report  Level of [place]: Independent with ADLs and functional transfers    Objective     Functional Assessments:  Gait Comment: hip drop gait dur to pain in hip and low back    HIP    Hip AROM  Hip AROM WFL: yes    Specific Lower Extremity MMT  R Iliopsoas: (5/5): 5  R Gluteals  (sidelying): (5/5): 2+  R Hip External Rotation: (5/5): 3+ (Internal: 4)  R knee flexion: (5/5): 5  R knee extension: (5/5): 5     and KNEE    Observation  Observation Comment: R slightly swollen; quad set fair however noted reduction compared to R side.    Knee PROM  R knee flexion: (140°): 132  R knee extension: (0°): 3 (fair quad contraction)    Outcome Measures:  Other Measures  Lower Extremity Funtional Score (LEFS): 52     Treatments:  Therapeutic Exercise  Therapeutic Exercise Performed: Yes  Therapeutic Exercise Activity 1: Access Code [email or web address]  - Sit to Stand  - 1 x daily - 7 x weekly - 3 sets - 10 reps  - Seated Long Arc Quad  - 1 x daily - 7 x weekly - 3 sets - 10 reps  - Seated Hip Abduction with Resistance  - 1 x daily - 7 x weekly - 3 sets - 10 reps  - Clamshell  - 1 x daily - 7 x weekly - 3 sets - 10 reps  - Hooklying Clamshell with Resistance  - 1 x daily - 7 x weekly - 3 sets - 10 reps  Therapeutic Activity  Therapeutic Activity Performed: Yes  Therapeutic Activity 1: Educated on HEP  Therapeutic Activity 2: Educated on stabilizing muscles for the knee  Therapeutic Activity 3: Educated on hip involvement in knee pain    Assessment   Assessment:  Assessment Comment: Pt is a 58 y.o female presenting with R knee swelling and R hip pain. Range of motion was within normal limits, except for knee extension with a slight deficit due to poor quad contraction. Strength deficits were noted with hip rotators and significant deficits with abduction creating gait abnormality and reduced stability of the knee. Overall, patient presents with strength deficits of the hip and reduced stability of the knee. Pt would benefit from skilled physical therapy to improve strength, increase stability, and prevent further functional decline.    Plan:  Treatment/Interventions: Blood flow restriction therapy, Cryotherapy, Dry needling, Education/ Instruction, Hot pack, Manual therapy, Neuromuscular re-education, Taping  techniques, Therapeutic activities, Therapeutic exercises  PT Plan: Skilled PT  PT Frequency: 1 time per week  Duration: 6 weeks + eval  Onset Date: 06/18/24  Number of Treatments Authorized: Auth Required  Rehab Potential: Good  Plan of Care Agreement: Patient    OP EDUCATION:  Outpatient Education  Individual(s) Educated: Patient  Education Provided: Anatomy, Home Exercise Program, POC  Risk and Benefits Discussed with Patient/Caregiver/Other: yes  Patient/Caregiver Demonstrated Understanding: yes  Plan of Care Discussed and Agreed Upon: yes  Patient Response to Education: Patient/Caregiver Verbalized Understanding of Information, Patient/Caregiver Performed Return Demonstration of Exercises/Activities, Patient/Caregiver Asked Appropriate Questions    Goals:  Active       PT Problem       PT Goal 1       Start:  06/18/24    Expected End:  07/23/24       Pt will be 50% IND with HEP in 4 weeks in order to progress with therapy.          PT Goal 2       Start:  06/18/24    Expected End:  07/23/24       Pt will demonstrate subjective improvement of ADLs and recreational activities through improved score of 65 on LEFS in 4 weeks for household and community ambulation.          PT Goal 3       Start:  06/18/24    Expected End:  07/23/24       Pt will improve R hip abductor strength to 3+/5 in 4 weeks in order to improve transfers, ambulation and stair negotiation           PT Goal 4       Start:  06/18/24    Expected End:  07/23/24       Pt will be able to perform sit<>stand from normal chair height with UE assist PRN in 4 weeks to improve car, toilet, and daily transfer needs.             PT Problem       PT Goal 1       Start:  06/18/24    Expected End:  08/22/24       Pt will be 100% IND with HEP in 8 weeks in order to maintain progress with therapy.           PT Goal 2       Start:  06/18/24    Expected End:  08/22/24       Pt will demonstrate subjective improvement of ADLs and recreational activities through  improved score of 70 on LEFS in 8 weeks for community and daily tasks.           PT Goal 3       Start:  06/18/24    Expected End:  08/22/24       Pt will improve R hip abductor strength to 4+/5 in 8 weeks in order to improve transfers, ambulation and stair negotiation            PT Goal 4       Start:  06/18/24    Expected End:  08/22/24       Pt will be able to perform sit<>stand from normal chair height without UE assist in 8 weeks to improve car, toilet, and daily tasks.           PT Goal 5       Start:  06/18/24    Expected End:  08/22/24       Pt will be able to perform community ambulation demonstrating normalized gait pattern in 8 weeks for community ambulation needs and reduced strain through joint structures to prevent further injury.          Patient Stated Goal 1       Start:  06/18/24    Expected End:  08/22/24       Patient will report pain level no higher than 2/10 in R hip in 8 weeks.

## 2024-06-19 ENCOUNTER — APPOINTMENT (OUTPATIENT)
Dept: RADIOLOGY | Facility: HOSPITAL | Age: 59
End: 2024-06-19
Payer: MEDICAID

## 2024-06-20 PROCEDURE — RXMED WILLOW AMBULATORY MEDICATION CHARGE

## 2024-06-24 PROCEDURE — RXMED WILLOW AMBULATORY MEDICATION CHARGE

## 2024-06-25 ENCOUNTER — HOSPITAL ENCOUNTER (EMERGENCY)
Facility: HOSPITAL | Age: 59
Discharge: HOME | End: 2024-06-25
Attending: STUDENT IN AN ORGANIZED HEALTH CARE EDUCATION/TRAINING PROGRAM
Payer: MEDICAID

## 2024-06-25 ENCOUNTER — APPOINTMENT (OUTPATIENT)
Dept: RADIOLOGY | Facility: HOSPITAL | Age: 59
End: 2024-06-25
Payer: MEDICAID

## 2024-06-25 ENCOUNTER — TELEPHONE (OUTPATIENT)
Dept: CARDIOLOGY | Facility: CLINIC | Age: 59
End: 2024-06-25

## 2024-06-25 VITALS
DIASTOLIC BLOOD PRESSURE: 81 MMHG | BODY MASS INDEX: 33.6 KG/M2 | RESPIRATION RATE: 16 BRPM | OXYGEN SATURATION: 98 % | HEART RATE: 68 BPM | HEIGHT: 71 IN | TEMPERATURE: 98.2 F | WEIGHT: 240 LBS | SYSTOLIC BLOOD PRESSURE: 161 MMHG

## 2024-06-25 DIAGNOSIS — R07.9 CHEST PAIN, UNSPECIFIED TYPE: Primary | ICD-10-CM

## 2024-06-25 LAB
ALBUMIN SERPL-MCNC: 4.2 G/DL (ref 3.5–5)
ALP BLD-CCNC: 76 U/L (ref 35–125)
ALT SERPL-CCNC: 11 U/L (ref 5–40)
ANION GAP SERPL CALC-SCNC: 10 MMOL/L
APPEARANCE UR: CLEAR
AST SERPL-CCNC: 12 U/L (ref 5–40)
BASOPHILS # BLD AUTO: 0.05 X10*3/UL (ref 0–0.1)
BASOPHILS NFR BLD AUTO: 0.6 %
BILIRUB SERPL-MCNC: 0.3 MG/DL (ref 0.1–1.2)
BILIRUB UR STRIP.AUTO-MCNC: NEGATIVE MG/DL
BUN SERPL-MCNC: 14 MG/DL (ref 8–25)
CALCIUM SERPL-MCNC: 9.3 MG/DL (ref 8.5–10.4)
CHLORIDE SERPL-SCNC: 104 MMOL/L (ref 97–107)
CO2 SERPL-SCNC: 28 MMOL/L (ref 24–31)
COLOR UR: ABNORMAL
CREAT SERPL-MCNC: 0.7 MG/DL (ref 0.4–1.6)
EGFRCR SERPLBLD CKD-EPI 2021: >90 ML/MIN/1.73M*2
EOSINOPHIL # BLD AUTO: 0.2 X10*3/UL (ref 0–0.7)
EOSINOPHIL NFR BLD AUTO: 2.5 %
ERYTHROCYTE [DISTWIDTH] IN BLOOD BY AUTOMATED COUNT: 15.7 % (ref 11.5–14.5)
GLUCOSE SERPL-MCNC: 81 MG/DL (ref 65–99)
GLUCOSE UR STRIP.AUTO-MCNC: NORMAL MG/DL
HCG UR QL IA.RAPID: NEGATIVE
HCT VFR BLD AUTO: 43.2 % (ref 36–46)
HGB BLD-MCNC: 14.3 G/DL (ref 12–16)
IMM GRANULOCYTES # BLD AUTO: 0.04 X10*3/UL (ref 0–0.7)
IMM GRANULOCYTES NFR BLD AUTO: 0.5 % (ref 0–0.9)
KETONES UR STRIP.AUTO-MCNC: NEGATIVE MG/DL
LEUKOCYTE ESTERASE UR QL STRIP.AUTO: NEGATIVE
LYMPHOCYTES # BLD AUTO: 2.38 X10*3/UL (ref 1.2–4.8)
LYMPHOCYTES NFR BLD AUTO: 30.1 %
MCH RBC QN AUTO: 29.5 PG (ref 26–34)
MCHC RBC AUTO-ENTMCNC: 33.1 G/DL (ref 32–36)
MCV RBC AUTO: 89 FL (ref 80–100)
MONOCYTES # BLD AUTO: 0.49 X10*3/UL (ref 0.1–1)
MONOCYTES NFR BLD AUTO: 6.2 %
NEUTROPHILS # BLD AUTO: 4.75 X10*3/UL (ref 1.2–7.7)
NEUTROPHILS NFR BLD AUTO: 60.1 %
NITRITE UR QL STRIP.AUTO: NEGATIVE
NRBC BLD-RTO: 0 /100 WBCS (ref 0–0)
PH UR STRIP.AUTO: 6 [PH]
PLATELET # BLD AUTO: 211 X10*3/UL (ref 150–450)
POTASSIUM SERPL-SCNC: 4.5 MMOL/L (ref 3.4–5.1)
PROT SERPL-MCNC: 7.3 G/DL (ref 5.9–7.9)
PROT UR STRIP.AUTO-MCNC: NEGATIVE MG/DL
RBC # BLD AUTO: 4.85 X10*6/UL (ref 4–5.2)
RBC # UR STRIP.AUTO: ABNORMAL /UL
RBC #/AREA URNS AUTO: NORMAL /HPF
SODIUM SERPL-SCNC: 142 MMOL/L (ref 133–145)
SP GR UR STRIP.AUTO: 1.01
TROPONIN T SERPL-MCNC: 6 NG/L
TROPONIN T SERPL-MCNC: <6 NG/L
UROBILINOGEN UR STRIP.AUTO-MCNC: NORMAL MG/DL
WBC # BLD AUTO: 7.9 X10*3/UL (ref 4.4–11.3)
WBC #/AREA URNS AUTO: NORMAL /HPF

## 2024-06-25 PROCEDURE — 84484 ASSAY OF TROPONIN QUANT: CPT

## 2024-06-25 PROCEDURE — 71046 X-RAY EXAM CHEST 2 VIEWS: CPT | Performed by: RADIOLOGY

## 2024-06-25 PROCEDURE — 85025 COMPLETE CBC W/AUTO DIFF WBC: CPT

## 2024-06-25 PROCEDURE — 36415 COLL VENOUS BLD VENIPUNCTURE: CPT

## 2024-06-25 PROCEDURE — 2500000001 HC RX 250 WO HCPCS SELF ADMINISTERED DRUGS (ALT 637 FOR MEDICARE OP)

## 2024-06-25 PROCEDURE — 99283 EMERGENCY DEPT VISIT LOW MDM: CPT | Mod: 25

## 2024-06-25 PROCEDURE — 71046 X-RAY EXAM CHEST 2 VIEWS: CPT

## 2024-06-25 PROCEDURE — 81003 URINALYSIS AUTO W/O SCOPE: CPT

## 2024-06-25 PROCEDURE — 81025 URINE PREGNANCY TEST: CPT

## 2024-06-25 PROCEDURE — 84075 ASSAY ALKALINE PHOSPHATASE: CPT

## 2024-06-25 RX ORDER — ONDANSETRON HYDROCHLORIDE 2 MG/ML
4 INJECTION, SOLUTION INTRAVENOUS ONCE
Status: DISCONTINUED | OUTPATIENT
Start: 2024-06-25 | End: 2024-06-25 | Stop reason: HOSPADM

## 2024-06-25 RX ORDER — NAPROXEN SODIUM 220 MG/1
324 TABLET, FILM COATED ORAL ONCE
Status: COMPLETED | OUTPATIENT
Start: 2024-06-25 | End: 2024-06-25

## 2024-06-25 ASSESSMENT — PAIN SCALES - GENERAL
PAINLEVEL_OUTOF10: 8
PAINLEVEL_OUTOF10: 7

## 2024-06-25 ASSESSMENT — PAIN DESCRIPTION - DESCRIPTORS
DESCRIPTORS: ACHING;SHARP;PRESSURE
DESCRIPTORS: ACHING;SHARP;PRESSURE

## 2024-06-25 ASSESSMENT — PAIN DESCRIPTION - PAIN TYPE: TYPE: ACUTE PAIN

## 2024-06-25 ASSESSMENT — HEART SCORE
RISK FACTORS: >2 RISK FACTORS OR HX OF ATHEROSCLEROTIC DISEASE
TROPONIN: LESS THAN OR EQUAL TO NORMAL LIMIT
ECG: NORMAL
AGE: 45-64
HISTORY: SLIGHTLY SUSPICIOUS
HEART SCORE: 3

## 2024-06-25 ASSESSMENT — PAIN DESCRIPTION - LOCATION: LOCATION: CHEST

## 2024-06-25 ASSESSMENT — PAIN - FUNCTIONAL ASSESSMENT: PAIN_FUNCTIONAL_ASSESSMENT: 0-10

## 2024-06-25 ASSESSMENT — PAIN DESCRIPTION - PROGRESSION: CLINICAL_PROGRESSION: NOT CHANGED

## 2024-06-25 ASSESSMENT — PAIN DESCRIPTION - ONSET: ONSET: ONGOING

## 2024-06-25 ASSESSMENT — COLUMBIA-SUICIDE SEVERITY RATING SCALE - C-SSRS
1. IN THE PAST MONTH, HAVE YOU WISHED YOU WERE DEAD OR WISHED YOU COULD GO TO SLEEP AND NOT WAKE UP?: NO
6. HAVE YOU EVER DONE ANYTHING, STARTED TO DO ANYTHING, OR PREPARED TO DO ANYTHING TO END YOUR LIFE?: NO
2. HAVE YOU ACTUALLY HAD ANY THOUGHTS OF KILLING YOURSELF?: NO

## 2024-06-25 ASSESSMENT — PAIN DESCRIPTION - FREQUENCY: FREQUENCY: CONSTANT/CONTINUOUS

## 2024-06-25 NOTE — ED PROVIDER NOTES
HPI   Chief Complaint   Patient presents with    Chest Pain     Since 0800 this morning. Chest pain/pressure. Worse when breathing deep.       Patient is a 58-year-old female presents emergency department for evaluation of left-sided chest pain.  She describes it as a dull aching pain with medial episodes sharp stabbing pain.  She states that the pain is worse if she were to take a deep breath.  She does not necessarily feel short of breath, but rather feels like the wind was knocked out of her.  She states has been ongoing since around 8 AM this morning.  She notes a history of atrial fibrillation and high blood pressure following with cardiologist Dr. Fulton currently on Eliquis.  She denies any recent emesis, cough, congestion, fevers, chills, nausea, vomiting, lightheadedness, dizziness.  States prior to today she is been feeling relatively well.      History provided by:  Patient   used: No                        [place] Coma Scale Score: 15                     Patient History   Past Medical History:   Diagnosis Date    Asthma (HHS-HCC)     Atrial fibrillation (Multi)     Hypertension     Neck pain     Right knee pain      Past Surgical History:   Procedure Laterality Date    CHOLECYSTECTOMY  1987     Family History   Problem Relation Name Age of Onset    Stroke Mother      No Known Problems Father       Social History     Tobacco Use    Smoking status: Every Day     Current packs/day: 1.00     Average packs/day: 1 pack/day for 37.5 years (37.5 ttl pk-yrs)     Types: Cigarettes     Start date: 1987    Smokeless tobacco: Never   Vaping Use    Vaping status: Never Used   Substance Use Topics    Alcohol use: Yes     Comment: occasional    Drug use: Yes     Types: Marijuana       Physical Exam   ED Triage Vitals [06/25/24 1508]   Temperature Heart Rate Respirations BP   36.8 °C (98.2 °F) 63 18 137/81      Pulse Ox Temp Source Heart Rate Source Patient Position   100 % Tympanic -- Sitting       BP Location FiO2 (%)     Left arm --       Physical Exam  Constitutional:       Appearance: She is well-developed.   Cardiovascular:      Rate and Rhythm: Normal rate and regular rhythm.   Pulmonary:      Effort: Pulmonary effort is normal.      Breath sounds: Normal breath sounds.   Abdominal:      General: Bowel sounds are normal.      Palpations: Abdomen is soft.      Tenderness: There is no abdominal tenderness.   Musculoskeletal:         General: Normal range of motion.   Skin:     General: Skin is warm and dry.   Neurological:      General: No focal deficit present.      Mental Status: She is alert and oriented to person, place, and time.         ED Course & MDM   ED Course as of 06/25/24 2245 Tue Jun 25, 2024   1510 EKG Time:1507  EKG Interpretation time:1510  EKG Interpretation: EKG shows normal sinus rhythm with a rate of 67 bpm, normal axis, QTc normal at 409, no evidence of STEMI or ischemia.    EKG was interpreted by myself independently [JL]      ED Course User Index  [JL] Edgar Medina, DO         Diagnoses as of 06/25/24 2245   Chest pain, unspecified type       Medical Decision Making  Patient is a 58-year-old female presents emergency department for evaluation of chest pain.    EKG was interpreted by attending physician and reviewed by me.    Lab work done today included CMP, CBC, troponins, urinalysis, urine pregnancy.  Lab work with initial troponin of 6 with repeat troponin less than 6 within normal range and otherwise unremarkable.    Scans done today were interpreted/confirmed by radiologist and also interpreted by me which included chest x-ray.  Chest x-ray shows no evidence for acute cardiopulmonary process.    Medications given at today's visit include PO aspirin, IV Zofran    I saw this patient in conjunction with Dr. Medina.  Patient sangeeta stable in the emergency department.  She is somewhat improvement of symptoms but still having some discomfort.  It is somewhat reproducible on  exam.  EKG shows no evidence of ischemia and troponins within normal range.  Patient has heart score of 3 giving her low risk for major cardiac event and stable to be discharged to follow-up closely outpatient with her cardiologist.  She was educated on the importance of close follow-up with cardiology as well as primary care provider.  She is agreeable to plan and discharge at this time.  Emergent pathologies were considered for this patient, although I have low suspicion for anything acutely emergent given patient's clinical presentation, history, physical exam, stable vital signs, and relatively unremarkable workup.  Discharging patient home is reasonable plan of care for outpatient management.    All labs, imaging, and diagnostic studies were reviewed by me and patient was counseled on clinical impression, expectations, and plan.  Patient was educated to follow-up with PCP in the following 1-2 days.  All questions from patient were answered. They elicited understanding and were agreeable to course of treatment.  Patient was discharged in stable condition and given strict return precautions.    ** Disclaimer:  Parts of this document were written utilizing a voice to text dictation software.  Note may contain minor transcription or typographical errors that were inadvertently transcribed by the computer software.        Procedure  Procedures     Preethi Payton PA-C  06/25/24 9650

## 2024-06-26 ENCOUNTER — PHARMACY VISIT (OUTPATIENT)
Dept: PHARMACY | Facility: CLINIC | Age: 59
End: 2024-06-26
Payer: MEDICAID

## 2024-06-26 PROCEDURE — RXMED WILLOW AMBULATORY MEDICATION CHARGE

## 2024-06-26 RX ORDER — LORAZEPAM 0.5 MG/1
TABLET ORAL
Qty: 14 TABLET | Refills: 0 | OUTPATIENT
Start: 2024-06-26

## 2024-06-27 PROCEDURE — RXMED WILLOW AMBULATORY MEDICATION CHARGE

## 2024-06-28 PROCEDURE — RXMED WILLOW AMBULATORY MEDICATION CHARGE

## 2024-07-01 ENCOUNTER — PHARMACY VISIT (OUTPATIENT)
Dept: PHARMACY | Facility: CLINIC | Age: 59
End: 2024-07-01
Payer: MEDICAID

## 2024-07-05 ENCOUNTER — APPOINTMENT (OUTPATIENT)
Dept: PHYSICAL THERAPY | Facility: CLINIC | Age: 59
End: 2024-07-05
Payer: MEDICAID

## 2024-07-09 ENCOUNTER — OFFICE VISIT (OUTPATIENT)
Dept: CARDIOLOGY | Facility: CLINIC | Age: 59
End: 2024-07-09
Payer: MEDICAID

## 2024-07-09 ENCOUNTER — HOSPITAL ENCOUNTER (OUTPATIENT)
Dept: RADIOLOGY | Facility: HOSPITAL | Age: 59
Discharge: HOME | End: 2024-07-09
Payer: MEDICAID

## 2024-07-09 DIAGNOSIS — M54.2 CERVICALGIA: ICD-10-CM

## 2024-07-09 PROCEDURE — 72050 X-RAY EXAM NECK SPINE 4/5VWS: CPT | Performed by: RADIOLOGY

## 2024-07-09 PROCEDURE — 72050 X-RAY EXAM NECK SPINE 4/5VWS: CPT

## 2024-07-09 NOTE — PROGRESS NOTES
Subjective      Chief Complaint   Patient presents with    Hospital Follow-up        HPI     Review of Systems   All other systems reviewed and are negative.       Objective   Physical Exam  Constitutional:       Appearance: Normal appearance.   HENT:      Head: Normocephalic and atraumatic.   Eyes:      Pupils: Pupils are equal, round, and reactive to light.   Cardiovascular:      Rate and Rhythm: Normal rate and regular rhythm.      Pulses: Normal pulses.      Heart sounds: Normal heart sounds.   Pulmonary:      Effort: Pulmonary effort is normal.      Breath sounds: Normal breath sounds.   Abdominal:      General: Abdomen is flat. Bowel sounds are normal.      Palpations: Abdomen is soft.   Musculoskeletal:         General: Normal range of motion.      Cervical back: Normal range of motion.   Skin:     General: Skin is warm and dry.   Neurological:      General: No focal deficit present.   Psychiatric:         Mood and Affect: Mood normal.         Judgment: Judgment normal.          Lab Review:   Not applicable    No problem-specific Assessment & Plan notes found for this encounter.

## 2024-07-12 ENCOUNTER — TREATMENT (OUTPATIENT)
Dept: PHYSICAL THERAPY | Facility: CLINIC | Age: 59
End: 2024-07-12
Payer: MEDICAID

## 2024-07-12 ENCOUNTER — APPOINTMENT (OUTPATIENT)
Dept: OPHTHALMOLOGY | Facility: CLINIC | Age: 59
End: 2024-07-12
Payer: MEDICAID

## 2024-07-12 DIAGNOSIS — H52.7 REFRACTION ERROR: ICD-10-CM

## 2024-07-12 DIAGNOSIS — M25.551 RIGHT HIP PAIN: ICD-10-CM

## 2024-07-12 DIAGNOSIS — M25.561 RIGHT KNEE PAIN, UNSPECIFIED CHRONICITY: ICD-10-CM

## 2024-07-12 DIAGNOSIS — H25.13 AGE-RELATED NUCLEAR CATARACT OF BOTH EYES: ICD-10-CM

## 2024-07-12 DIAGNOSIS — H04.123 DRY EYE SYNDROME OF BOTH EYES: ICD-10-CM

## 2024-07-12 DIAGNOSIS — H35.033 HYPERTENSIVE RETINOPATHY OF BOTH EYES: Primary | ICD-10-CM

## 2024-07-12 PROCEDURE — 92015 DETERMINE REFRACTIVE STATE: CPT | Performed by: OPHTHALMOLOGY

## 2024-07-12 PROCEDURE — 99204 OFFICE O/P NEW MOD 45 MIN: CPT | Performed by: OPHTHALMOLOGY

## 2024-07-12 PROCEDURE — 97110 THERAPEUTIC EXERCISES: CPT | Mod: GP,CQ

## 2024-07-12 ASSESSMENT — REFRACTION_MANIFEST
METHOD_AUTOREFRACTION: 1
OS_AXIS: 094
OD_SPHERE: +0.25
OS_SPHERE: +0.75
OS_ADD: +1.50
OS_CYLINDER: +0.50
OD_CYLINDER: -1.00
OD_CYLINDER: +0.50
OD_ADD: +1.50
OS_CYLINDER: -0.50
OD_AXIS: 085
OS_AXIS: 002
OS_SPHERE: +0.25
OD_SPHERE: +1.25
OD_AXIS: 176

## 2024-07-12 ASSESSMENT — CUP TO DISC RATIO
OS_RATIO: 0.3
OD_RATIO: 0.3

## 2024-07-12 ASSESSMENT — VISUAL ACUITY
OS_SC+: -2
OD_SC+: -1
OD_SC: 20/20
OS_SC: 20/20
METHOD: SNELLEN - LINEAR

## 2024-07-12 ASSESSMENT — CONF VISUAL FIELD
METHOD: COUNTING FINGERS
OS_NORMAL: 1
OD_INFERIOR_TEMPORAL_RESTRICTION: 0
OD_SUPERIOR_NASAL_RESTRICTION: 0
OS_INFERIOR_TEMPORAL_RESTRICTION: 0
OD_NORMAL: 1
OD_SUPERIOR_TEMPORAL_RESTRICTION: 0
OS_SUPERIOR_NASAL_RESTRICTION: 0
OS_SUPERIOR_TEMPORAL_RESTRICTION: 0
OS_INFERIOR_NASAL_RESTRICTION: 0
OD_INFERIOR_NASAL_RESTRICTION: 0

## 2024-07-12 ASSESSMENT — ENCOUNTER SYMPTOMS
ALLERGIC/IMMUNOLOGIC NEGATIVE: 0
EYES NEGATIVE: 0
MUSCULOSKELETAL NEGATIVE: 0
CONSTITUTIONAL NEGATIVE: 0
NEUROLOGICAL NEGATIVE: 0
ENDOCRINE NEGATIVE: 0
CARDIOVASCULAR NEGATIVE: 0
PSYCHIATRIC NEGATIVE: 0
GASTROINTESTINAL NEGATIVE: 0
HEMATOLOGIC/LYMPHATIC NEGATIVE: 0
RESPIRATORY NEGATIVE: 0

## 2024-07-12 ASSESSMENT — SLIT LAMP EXAM - LIDS
COMMENTS: NORMAL
COMMENTS: NORMAL

## 2024-07-12 ASSESSMENT — TONOMETRY
OS_IOP_MMHG: 17
OD_IOP_MMHG: 17
IOP_METHOD: GOLDMANN APPLANATION

## 2024-07-12 ASSESSMENT — EXTERNAL EXAM - LEFT EYE: OS_EXAM: NORMAL

## 2024-07-12 ASSESSMENT — EXTERNAL EXAM - RIGHT EYE: OD_EXAM: NORMAL

## 2024-07-12 ASSESSMENT — PAIN SCALES - GENERAL: PAINLEVEL_OUTOF10: 5 - MODERATE PAIN

## 2024-07-12 NOTE — LETTER
"July 12, 2024    Stephen Estrada MD  [address] [place] [address]  [place] [address]    Patient: Lili Doe   YOB: 1965   Date of Visit: 7/12/2024       Dear Dr. Stephen Estrada MD:    Thank you for referring Lili Doe to me for evaluation. Here is my assessment and plan of care:    Assessment/Plan:  Lili was seen today for presbyopia and dry eye syndrome of both eyes .  Diagnoses and all orders for this visit:  Hypertensive retinopathy of both eyes (Primary)  Age-related nuclear cataract of both eyes  Dry eye syndrome of both eyes  Refraction error    Mild hypertension changes of retinal vasculature. Advised to continue best HTN control.     Below you can find relevant pieces of the exam. If you have questions, please do not hesitate to call me. I look forward to following Lili along with you.         Sincerely,        Delta Singleton MD        CC:   No Recipients         External Exam         Right Left    External Normal Normal              Slit Lamp Exam         Right Left    Lids/Lashes Normal Normal    Conjunctiva/Sclera White and quiet White and quiet    Cornea Clear Clear    Anterior Chamber Deep and quiet Deep and quiet    Iris Round and reactive Round and reactive    Lens 1+ Nuclear sclerosis 1+ Nuclear sclerosis              Fundus Exam         Right Left    Vitreous Normal Normal    Disc Normal Normal    C/D Ratio 0.3 0.3    Macula Normal Normal    Vessels Tortuous, silver wiring Tortuous, silver wiring    Periphery Normal Normal                   <div id=\"MAIN_[email or web address]\"></div>     "

## 2024-07-12 NOTE — PATIENT INSTRUCTIONS
Thank you so much for choosing me to provide your care today!    If you were dilated your vision may remain blurry   or light sensitive for several hours.    The nature of eye and vision problems can require frequent follow up, please make every effort to adhere to any future appointments.    If you have any issues, questions, or concerns,   please do not hesitate to reach out.    If you receive a survey in regards to your care today, please mention any exceptional care my office staff and/or technicians provided.    You can reach our office at this number:  [phone]

## 2024-07-12 NOTE — PROGRESS NOTES
Assessment/Plan   Problem List Items Addressed This Visit       Dry eye syndrome     Advised on regular daily lubrication         Age-related nuclear cataract of both eyes     Non significant cataract noted on exam. Will plan to continue to monitor with serial exam.            Hypertensive retinopathy of both eyes - Primary     Showing mild HTN changes both eyes (OU), should continue best HTN control. Will monitor with serial exam.          Refraction error     Discussed glasses prescription from refraction. Will provide if patient interested in keeping for records or to fill as a new set of glasses.               Provided reassurance regarding above diagnoses and care received in the office visit today. Discussed outcomes and options along with the importance of treatment compliance. Understands the importance of any follow up visits. Patient instructed to call/communicate with our office if any new issues, questions, or concerns.     Will plan to see back in 1 year full or sooner PRN

## 2024-07-12 NOTE — PROGRESS NOTES
"Physical Therapy Treatment    Patient Name: Lili Doe  MRN: [id number]  Today's Date: 7/12/2024  Time Calculation  Start Time: 1017  Stop Time: 1102  Time Calculation (min): 45 min  PT Therapeutic Procedures Time Entry  Therapeutic Exercise Time Entry: 41    Insurance:  Visit number: eval+1 of 8 requested  Authorization info: 8 visits 7/1-9/29 CPTs [id number]    EVAL ONLY - [facility] COMM PLAN MCAID - F/U REQ AUTH / 100% COVERAGE / 30V/YR/THERAPY / 5/30/24  Insurance Type: Payor: [employer] KILO / Plan: [employer] PLAN / Product Type: *No Product type* /     Current Problem   1. Right knee pain, unspecified chronicity  Follow Up In Physical Therapy      2. Right hip pain  Follow Up In Physical Therapy          Subjective   Pt reports moderate R hip/knee pain, LB pain on arrival.    General   Reason for Referral: R knee  Referred By: Dr. Crouch  General Comment: Pt reports R hip and knee pain on arrival.  Precautions:  Precautions  Precautions Comment: has trouble laying supine due to slipped discs in L spine  Pain   0-10 (Numeric) Pain Score: 5 - Moderate pain  Pain Location: Hip  Pain Orientation: Right  Pain Radiating Towards: R Knee  Pain Frequency: Constant/continuous  Post Treatment Pain Level 4/10    Objective   Guarded posture with movement this visit.    Treatments:  Therapeutic Exercise:  Therapeutic Exercise  Therapeutic Exercise Performed: Yes  Therapeutic Exercise Activity 1: Access Code [email or web address]  Therapeutic Exercise Activity 2: Seated hamstring stretch 20\"x3 L/R each  Therapeutic Exercise Activity 3: Seated with lumbar support abdominal brace 5\" hold 2x10  Therapeutic Exercise Activity 4: Seated with lumbar abdominal brace with pelvic floor  Therapeutic Exercise Activity 5: Seated scap retraction 5\" hold 2x10  Therapeutic Exercise Activity 6: LAQs with abdominal brace 2x10 L/R each  Therapeutic Exercise Activity 7: Seated hamstring curls with PTB 2x10 " "L/R each  Therapeutic Exercise Activity 8: Seated hip adduction 5\" hold 2x10  Therapeutic Exercise Activity 9: Seated hip abduction with PTB 5\" hold 2x10  Therapeutic Exercise Activity 10: Sit to stand at table 1x10       Assessment   Assessment:   PT Assessment  PT Assessment Results: Decreased strength, Decreased range of motion, Decreased endurance, Decreased mobility, Decreased coordination, Pain  Rehab Prognosis: Good  Evaluation/Treatment Tolerance: Patient tolerated treatment well  Assessment Comment: Pt tolerates ther ex progressions with mild recution in S/S.    Plan:   OP PT Plan  Treatment/Interventions: Blood flow restriction therapy, Cryotherapy, Dry needling, Education/ Instruction, Hot pack, Manual therapy, Neuromuscular re-education, Taping techniques, Therapeutic activities, Therapeutic exercises  PT Plan: Skilled PT  PT Frequency: 1 time per week  Duration: 6 weeks + eval  Onset Date: 06/18/24  Number of Treatments Authorized: Auth Required  Rehab Potential: Good  Plan of Care Agreement: Patient    OP EDUCATION:  Outpatient Education  Individual(s) Educated: Patient  Education Provided: Body Mechanics, Anatomy, Home Exercise Program  Patient/Caregiver Demonstrated Understanding: yes  Patient Response to Education: Patient/Caregiver Verbalized Understanding of Information, Patient/Caregiver Performed Return Demonstration of Exercises/Activities, Patient/Caregiver Asked Appropriate Questions    Goals:   Active       PT Problem       PT Goal 1       Start:  06/18/24    Expected End:  07/23/24       Pt will be 50% IND with HEP in 4 weeks in order to progress with therapy.          PT Goal 2       Start:  06/18/24    Expected End:  07/23/24       Pt will demonstrate subjective improvement of ADLs and recreational activities through improved score of 65 on LEFS in 4 weeks for household and community ambulation.          PT Goal 3       Start:  06/18/24    Expected End:  07/23/24       Pt will improve R " hip abductor strength to 3+/5 in 4 weeks in order to improve transfers, ambulation and stair negotiation           PT Goal 4       Start:  06/18/24    Expected End:  07/23/24       Pt will be able to perform sit<>stand from normal chair height with UE assist PRN in 4 weeks to improve car, toilet, and daily transfer needs.             PT Problem       PT Goal 1       Start:  06/18/24    Expected End:  08/22/24       Pt will be 100% IND with HEP in 8 weeks in order to maintain progress with therapy.           PT Goal 2       Start:  06/18/24    Expected End:  08/22/24       Pt will demonstrate subjective improvement of ADLs and recreational activities through improved score of 70 on LEFS in 8 weeks for community and daily tasks.           PT Goal 3       Start:  06/18/24    Expected End:  08/22/24       Pt will improve R hip abductor strength to 4+/5 in 8 weeks in order to improve transfers, ambulation and stair negotiation            PT Goal 4       Start:  06/18/24    Expected End:  08/22/24       Pt will be able to perform sit<>stand from normal chair height without UE assist in 8 weeks to improve car, toilet, and daily tasks.           PT Goal 5       Start:  06/18/24    Expected End:  08/22/24       Pt will be able to perform community ambulation demonstrating normalized gait pattern in 8 weeks for community ambulation needs and reduced strain through joint structures to prevent further injury.          Patient Stated Goal 1       Start:  06/18/24    Expected End:  08/22/24       Patient will report pain level no higher than 2/10 in R hip in 8 weeks.

## 2024-07-12 NOTE — ASSESSMENT & PLAN NOTE
Showing mild HTN changes both eyes (OU), should continue best HTN control. Will monitor with serial exam.

## 2024-07-15 PROCEDURE — RXMED WILLOW AMBULATORY MEDICATION CHARGE

## 2024-07-15 NOTE — PROGRESS NOTES
Subjective      Chief Complaint   Patient presents with    Right Hip - Pain     X 4-5 months worsening pain deep anterior groin as well as the lateral aspect. Denies YENI or radicular pain, worse with walking and c/o stiffness causing altered gait. Denies popping, locking, catching or instability.         Past Surgical History:   Procedure Laterality Date    CHOLECYSTECTOMY  1987        Past Medical History:   Diagnosis Date    Asthma (HHS-HCC)     Atrial fibrillation (Multi)     Hypertension     Neck pain     Right knee pain         HPI  This 58 year old patient presents today with right hip  and also lower back pain (8/10). The patient states that this right hip  and also lower back pain has been worsening and persistent for months. The patient denies trauma or injury to the right hip. The patient states that the right hip  and lower back pain is worse with and aggravated by walking and standing. The patient states that this right hip pain impairs their ability to complete normal activities of daily living. The patient has tried tylenol and ibuprofen with no relief.    Allergies   Allergen Reactions    Iodinated Contrast Media Itching    Sulfamethoxazole-Trimethoprim Hives and Unknown    Cheese Unknown     Swiss cheese      Penicillins Unknown    Sulfa (Sulfonamide Antibiotics) Unknown    Shellfish Derived Rash      Body mass index is 33.33 kg/m².     ROS  CARDIOLOGY:   Negative for chest pain, shortness of breath.   RESPIRATORY:   Negative for chest pain, shortness of breath.   MUSCULOSKELETAL:   See HPI for details.   NEUROLOGY:   Negative for tingling, numbness, weakness.    Objective      Body mass index is 33.33 kg/m².     Physical Exam  GENERAL:          General Appearance:  This is a pleasant patient with appropriate affect, in no acute distress.   DERMATOLOGY:          Skin: skin at the neck, upper and lower back, and trunk is intact. There is no evidence of skin rash, skin breakdown or ulceration, or  atrophic skin change.   EXTREMITIES:          Vascular:  Right, left hands and feet are warm with good color and pulses. Right and left calf and thigh are nontender and nonswollen.   NEUROLOGICAL:          Orientation:  Patient is alert and oriented to person, place, time and situation. Right and left upper and lower extremity motor and sensory examinations are intact.  MUSCULOSKELETAL: Neck: No tenderness. No pain or limitation with range of motion. Back:  there is diffuse tenderness at the lumbar spine. Straight leg test   Equivocal bilaterally. Left hip: nontender. No pain or limitation with ROM. Right hip: There is tenderness at the greater trochanter. There is pain with  and limitation of gentle ROM in flexion and extension at the hip. There is pain with  and limitation of  internal rotation,external rotation and abduction. right lower extremity is in good position. Nontender at the calf. Neurovascular is intact. The patient is seen walking today walking with a painful gait.    XR cervical spine complete 4-5 views    Result Date: 7/10/2024  Interpreted By:  Karon Buitrago, STUDY: Cervical spine, 4 views.   INDICATION: Signs/Symptoms:CERVICALGIA.   COMPARISON: None.   ACCESSION NUMBER(S): [id number]   ORDERING CLINICIAN: OLIVA PAUL   FINDINGS: Alignment is within normal limits. Mild C3-4, C4-5, C5-6, and C6-7 spondylosis with disc height loss and osteophytes.   Moderate mid and lower cervical facet arthropathy, asymmetrically worse on the left. Vertebral body heights are preserved. Posterior elements are intact. Mild right C4-5, mild right C5-6 osseous foraminal stenosis. Mild left C5-6 osseous foraminal stenosis. Prevertebral soft tissues are unremarkable.       1. Degenerative changes with osseous foraminal stenosis as above.   MACRO: None.   Signed by: Karon Buitrago 7/10/2024 8:28 PM Dictation workstation:   [id number]    XR chest 2 views    Result Date: 6/25/2024  Interpreted By:  Ricco Blount  STUDY: XR CHEST 2 VIEWS;  6/25/2024 4:34 pm   INDICATION: Signs/Symptoms:chest pain.   COMPARISON: 01/03/2024   ACCESSION NUMBER(S): [id number]   ORDERING CLINICIAN: JAKE VERA   FINDINGS: PA and lateral radiographs of the chest were provided.     CARDIOMEDIASTINAL SILHOUETTE: Cardiomediastinal silhouette is stable in size and configuration. Tortuosity of the thoracic aorta is again apparent. No mediastinal or hilar abnormality is noted.   LUNGS: Lungs are clear.   ABDOMEN: No remarkable upper abdominal findings.   BONES: No acute osseous changes.       1.  No evidence of acute cardiopulmonary process.       MACRO: None   Signed by: Ricco Blount 6/25/2024 5:09 PM Dictation workstation:   [id number]       Lili was seen today for pain.  Diagnoses and all orders for this visit:  Right hip pain (Primary)  Primary osteoarthritis of right hip      options are discussed with the patient in detail. The patient is given a prescription for physical therapy to evaluate and treat with gentle strengthening and ROM exercises with modalities as needed. The patient is instructed regarding activity modification and risk for further injury with falling or trauma, ice, physician directed at home gentle strengthening and ROM exercises, and the appropriate use of Tylenol as needed for pain with its potential adverse reactions and side effects. The patient understands. Please note that this report has been produced using speech recognition software.  It may contain errors related to grammar, punctuation or spelling.  Electronically signed, but not reviewed.    Thong Crouch MD

## 2024-07-16 ENCOUNTER — OFFICE VISIT (OUTPATIENT)
Dept: ORTHOPEDIC SURGERY | Facility: CLINIC | Age: 59
End: 2024-07-16
Payer: MEDICAID

## 2024-07-16 ENCOUNTER — HOSPITAL ENCOUNTER (OUTPATIENT)
Dept: RADIOLOGY | Facility: CLINIC | Age: 59
Discharge: HOME | End: 2024-07-16
Payer: MEDICAID

## 2024-07-16 ENCOUNTER — PHARMACY VISIT (OUTPATIENT)
Dept: PHARMACY | Facility: CLINIC | Age: 59
End: 2024-07-16
Payer: MEDICAID

## 2024-07-16 VITALS — WEIGHT: 239 LBS | HEIGHT: 71 IN | BODY MASS INDEX: 33.46 KG/M2

## 2024-07-16 DIAGNOSIS — M62.838 MUSCLE SPASMS OF NECK: ICD-10-CM

## 2024-07-16 DIAGNOSIS — M54.50 LUMBAR BACK PAIN: ICD-10-CM

## 2024-07-16 DIAGNOSIS — R52 PAIN: ICD-10-CM

## 2024-07-16 DIAGNOSIS — M47.812 OSTEOARTHRITIS OF CERVICAL AND LUMBAR SPINE: ICD-10-CM

## 2024-07-16 DIAGNOSIS — M47.22 CERVICAL RADICULOPATHY DUE TO DEGENERATIVE JOINT DISEASE OF SPINE: ICD-10-CM

## 2024-07-16 DIAGNOSIS — M16.11 PRIMARY OSTEOARTHRITIS OF RIGHT HIP: ICD-10-CM

## 2024-07-16 DIAGNOSIS — M70.61 GREATER TROCHANTERIC BURSITIS OF RIGHT HIP: ICD-10-CM

## 2024-07-16 DIAGNOSIS — M25.551 RIGHT HIP PAIN: Primary | ICD-10-CM

## 2024-07-16 DIAGNOSIS — M47.816 OSTEOARTHRITIS OF CERVICAL AND LUMBAR SPINE: ICD-10-CM

## 2024-07-16 PROCEDURE — 99213 OFFICE O/P EST LOW 20 MIN: CPT | Performed by: ORTHOPAEDIC SURGERY

## 2024-07-16 PROCEDURE — 73502 X-RAY EXAM HIP UNI 2-3 VIEWS: CPT | Mod: RT

## 2024-07-16 PROCEDURE — RXMED WILLOW AMBULATORY MEDICATION CHARGE

## 2024-07-16 ASSESSMENT — PAIN DESCRIPTION - DESCRIPTORS: DESCRIPTORS: ACHING

## 2024-07-16 ASSESSMENT — PATIENT HEALTH QUESTIONNAIRE - PHQ9
10. IF YOU CHECKED OFF ANY PROBLEMS, HOW DIFFICULT HAVE THESE PROBLEMS MADE IT FOR YOU TO DO YOUR WORK, TAKE CARE OF THINGS AT HOME, OR GET ALONG WITH OTHER PEOPLE: SOMEWHAT DIFFICULT
2. FEELING DOWN, DEPRESSED OR HOPELESS: SEVERAL DAYS
SUM OF ALL RESPONSES TO PHQ9 QUESTIONS 1 AND 2: 2
1. LITTLE INTEREST OR PLEASURE IN DOING THINGS: SEVERAL DAYS

## 2024-07-16 ASSESSMENT — LIFESTYLE VARIABLES
AUDIT TOTAL SCORE: 2
HAS A RELATIVE, FRIEND, DOCTOR, OR ANOTHER HEALTH PROFESSIONAL EXPRESSED CONCERN ABOUT YOUR DRINKING OR SUGGESTED YOU CUT DOWN: NO
AUDIT-C TOTAL SCORE: 2
HOW MANY STANDARD DRINKS CONTAINING ALCOHOL DO YOU HAVE ON A TYPICAL DAY: 1 OR 2
HAVE YOU OR SOMEONE ELSE BEEN INJURED AS A RESULT OF YOUR DRINKING: NO
HOW OFTEN DO YOU HAVE SIX OR MORE DRINKS ON ONE OCCASION: NEVER
SKIP TO QUESTIONS 9-10: 1
HOW OFTEN DO YOU HAVE A DRINK CONTAINING ALCOHOL: 2-4 TIMES A MONTH

## 2024-07-16 ASSESSMENT — PAIN - FUNCTIONAL ASSESSMENT: PAIN_FUNCTIONAL_ASSESSMENT: 0-10

## 2024-07-16 ASSESSMENT — ENCOUNTER SYMPTOMS
OCCASIONAL FEELINGS OF UNSTEADINESS: 1
LOSS OF SENSATION IN FEET: 0
DEPRESSION: 0

## 2024-07-16 ASSESSMENT — PAIN SCALES - GENERAL
PAINLEVEL: 9
PAINLEVEL_OUTOF10: 9

## 2024-07-16 NOTE — PATIENT INSTRUCTIONS
Thank you for coming to see us today!     Continue to use tylenol for pain control.   Rest, ice and elevate and remember to do PT and exercises.     Follow up  as needed

## 2024-07-19 ENCOUNTER — TREATMENT (OUTPATIENT)
Dept: PHYSICAL THERAPY | Facility: CLINIC | Age: 59
End: 2024-07-19
Payer: MEDICAID

## 2024-07-19 DIAGNOSIS — M25.561 RIGHT KNEE PAIN, UNSPECIFIED CHRONICITY: ICD-10-CM

## 2024-07-19 DIAGNOSIS — M25.551 RIGHT HIP PAIN: ICD-10-CM

## 2024-07-19 PROCEDURE — 97110 THERAPEUTIC EXERCISES: CPT | Mod: GP,CQ

## 2024-07-19 NOTE — PROGRESS NOTES
Physical Therapy Treatment    Patient Name: Lili Doe  MRN: [id number]  Today's Date: 7/19/2024  Time Calculation  Start Time: 1010  Stop Time: 1050  Time Calculation (min): 40 min  PT Therapeutic Procedures Time Entry  Therapeutic Exercise Time Entry: 40    Insurance:  Visit number: 3 of 8  Authorization info: 8 visits 7/1-9/29 CPTs 31041 [id number]    EVAL ONLY - [facility] COMM PLAN MCAID - F/U REQ AUTH / 100% COVERAGE / 30V/YR/THERAPY / 5/30/24  Insurance Type: Payor: [employer] KILO / Plan: [employer] PLAN / Product Type: *No Product type* /     Current Problem   1. Right knee pain, unspecified chronicity  Follow Up In Physical Therapy      2. Right hip pain  Follow Up In Physical Therapy          Subjective   General      Precautions:   None  Pain    R knee tightness; R hip 5  Post Treatment Pain Level 0    Objective   R glute med strength at 4-/5    Treatments:  Therapeutic Exercise:   Nu-step  TKE with ball against cupboard x 20  Side stepping with yellow band 6 x 20'  Towel slides 2 x 10 R  Bridges with GTB 2 x 10   H/L Hip ABD x 30  Clam shells 2 x 10 with GTB        Assessment   Assessment:    Pt tolerated session well. R hip musculature fatigued quickly.    Plan:    Continue with POC    OP EDUCATION:   Revised HEP    Goals:   Active       PT Problem       PT Goal 1 (Progressing)       Start:  06/18/24    Expected End:  07/23/24       Pt will be 50% IND with HEP in 4 weeks in order to progress with therapy.          PT Goal 2 (Progressing)       Start:  06/18/24    Expected End:  07/23/24       Pt will demonstrate subjective improvement of ADLs and recreational activities through improved score of 65 on LEFS in 4 weeks for household and community ambulation.          PT Goal 3 (Progressing)       Start:  06/18/24    Expected End:  07/23/24       Pt will improve R hip abductor strength to 3+/5 in 4 weeks in order to improve transfers, ambulation and stair  negotiation           PT Goal 4 (Progressing)       Start:  06/18/24    Expected End:  07/23/24       Pt will be able to perform sit<>stand from normal chair height with UE assist PRN in 4 weeks to improve car, toilet, and daily transfer needs.             PT Problem       PT Goal 1 (Progressing)       Start:  06/18/24    Expected End:  08/22/24       Pt will be 100% IND with HEP in 8 weeks in order to maintain progress with therapy.           PT Goal 2 (Progressing)       Start:  06/18/24    Expected End:  08/22/24       Pt will demonstrate subjective improvement of ADLs and recreational activities through improved score of 70 on LEFS in 8 weeks for community and daily tasks.           PT Goal 3 (Progressing)       Start:  06/18/24    Expected End:  08/22/24       Pt will improve R hip abductor strength to 4+/5 in 8 weeks in order to improve transfers, ambulation and stair negotiation            PT Goal 4 (Progressing)       Start:  06/18/24    Expected End:  08/22/24       Pt will be able to perform sit<>stand from normal chair height without UE assist in 8 weeks to improve car, toilet, and daily tasks.           PT Goal 5 (Progressing)       Start:  06/18/24    Expected End:  08/22/24       Pt will be able to perform community ambulation demonstrating normalized gait pattern in 8 weeks for community ambulation needs and reduced strain through joint structures to prevent further injury.          Patient Stated Goal 1 (Progressing)       Start:  06/18/24    Expected End:  08/22/24       Patient will report pain level no higher than 2/10 in R hip in 8 weeks.

## 2024-07-22 PROCEDURE — RXMED WILLOW AMBULATORY MEDICATION CHARGE

## 2024-07-24 ENCOUNTER — TREATMENT (OUTPATIENT)
Dept: PHYSICAL THERAPY | Facility: CLINIC | Age: 59
End: 2024-07-24
Payer: MEDICAID

## 2024-07-24 DIAGNOSIS — M47.812 OSTEOARTHRITIS OF CERVICAL AND LUMBAR SPINE: ICD-10-CM

## 2024-07-24 DIAGNOSIS — M25.551 RIGHT HIP PAIN: ICD-10-CM

## 2024-07-24 DIAGNOSIS — M54.50 LUMBAR BACK PAIN: ICD-10-CM

## 2024-07-24 DIAGNOSIS — M16.11 PRIMARY OSTEOARTHRITIS OF RIGHT HIP: ICD-10-CM

## 2024-07-24 DIAGNOSIS — M70.61 GREATER TROCHANTERIC BURSITIS OF RIGHT HIP: ICD-10-CM

## 2024-07-24 DIAGNOSIS — M47.816 OSTEOARTHRITIS OF CERVICAL AND LUMBAR SPINE: ICD-10-CM

## 2024-07-24 PROCEDURE — 97110 THERAPEUTIC EXERCISES: CPT | Mod: GP

## 2024-07-24 NOTE — PROGRESS NOTES
Physical Therapy Treatment    Patient Name: Lili Doe  MRN: [id number]  Today's Date: 7/24/2024  Time Calculation  Start Time: 1010  Stop Time: 1050  Time Calculation (min): 40 min  PT Therapeutic Procedures Time Entry  Therapeutic Exercise Time Entry: 40    Insurance:  Visit number: 5 of 8  Authorization info: 8 visits 7/1-9/29 CPTs [id number]    EVAL ONLY - [facility] COMM PLAN MCAID - F/U REQ AUTH / 100% COVERAGE / 30V/YR/THERAPY / 5/30/24  Insurance Type: Payor: [employer] KIL[employer] / Plan: [employer] PLAN / Product Type: *No Product type* /     Current Problem   1. Right knee pain, unspecified chronicity  Follow Up In Physical Therapy      2. Right hip pain  Follow Up In Physical Therapy          Subjective   General      Precautions:   None  Pain    R knee tightness; R hip 5  Post Treatment Pain Level 0    Objective   R glute med strength at 4-/5    Treatments:  Therapeutic Exercise:   Nu-step x 5 mins  TKE 2 x 10 R  Squat TKE 2 x 10  Bridges with BTB 2 x 10   H/L Hip ABD x 30  Clam shells 2 x 10 with GTB  Sidelying hip abd 2 x 10        Assessment   Assessment:    Emphasis of todays treatment was to focus on CKC strengthening. Pt will cont to progress with skilled PT to continue to address above impairments       Plan:    Continue with POC    OP EDUCATION:   Revised HEP    Goals:   Active       PT Problem       PT Goal 1 (Progressing)       Start:  06/18/24    Expected End:  07/23/24       Pt will be 50% IND with HEP in 4 weeks in order to progress with therapy.          PT Goal 2 (Progressing)       Start:  06/18/24    Expected End:  07/23/24       Pt will demonstrate subjective improvement of ADLs and recreational activities through improved score of 65 on LEFS in 4 weeks for household and community ambulation.          PT Goal 3 (Progressing)       Start:  06/18/24    Expected End:  07/23/24       Pt will improve R hip abductor strength to 3+/5 in 4 weeks in  order to improve transfers, ambulation and stair negotiation           PT Goal 4 (Progressing)       Start:  06/18/24    Expected End:  07/23/24       Pt will be able to perform sit<>stand from normal chair height with UE assist PRN in 4 weeks to improve car, toilet, and daily transfer needs.             PT Problem       PT Goal 1 (Progressing)       Start:  06/18/24    Expected End:  08/22/24       Pt will be 100% IND with HEP in 8 weeks in order to maintain progress with therapy.           PT Goal 2 (Progressing)       Start:  06/18/24    Expected End:  08/22/24       Pt will demonstrate subjective improvement of ADLs and recreational activities through improved score of 70 on LEFS in 8 weeks for community and daily tasks.           PT Goal 3 (Progressing)       Start:  06/18/24    Expected End:  08/22/24       Pt will improve R hip abductor strength to 4+/5 in 8 weeks in order to improve transfers, ambulation and stair negotiation            PT Goal 4 (Progressing)       Start:  06/18/24    Expected End:  08/22/24       Pt will be able to perform sit<>stand from normal chair height without UE assist in 8 weeks to improve car, toilet, and daily tasks.           PT Goal 5 (Progressing)       Start:  06/18/24    Expected End:  08/22/24       Pt will be able to perform community ambulation demonstrating normalized gait pattern in 8 weeks for community ambulation needs and reduced strain through joint structures to prevent further injury.          Patient Stated Goal 1 (Progressing)       Start:  06/18/24    Expected End:  08/22/24       Patient will report pain level no higher than 2/10 in R hip in 8 weeks.

## 2024-07-26 ENCOUNTER — TREATMENT (OUTPATIENT)
Dept: PHYSICAL THERAPY | Facility: CLINIC | Age: 59
End: 2024-07-26
Payer: MEDICAID

## 2024-07-26 DIAGNOSIS — M25.551 RIGHT HIP PAIN: ICD-10-CM

## 2024-07-26 DIAGNOSIS — M25.561 RIGHT KNEE PAIN, UNSPECIFIED CHRONICITY: ICD-10-CM

## 2024-07-26 PROCEDURE — RXMED WILLOW AMBULATORY MEDICATION CHARGE

## 2024-07-26 PROCEDURE — 97110 THERAPEUTIC EXERCISES: CPT | Mod: GP

## 2024-07-26 ASSESSMENT — PAIN - FUNCTIONAL ASSESSMENT: PAIN_FUNCTIONAL_ASSESSMENT: 0-10

## 2024-07-26 ASSESSMENT — PAIN SCALES - GENERAL: PAINLEVEL_OUTOF10: 5 - MODERATE PAIN

## 2024-07-26 NOTE — PROGRESS NOTES
Physical Therapy Treatment    Patient Name: Lili Doe  MRN: [id number]  Today's Date: 7/26/2024   930-10:15am    Insurance:  Visit number: 6 of 8  Authorization info: EVAL ONLY - [facility] COMM PLAN MCAID - F/U REQ AUTH / 100% COVERAGE / 30V/YR/THERAPY / REF: [id number] / ds 7/23/24.       8 visits 7/1-9/29 CPTs [id number]  Insurance Type: Payor: [employer] KILO / Plan: [employer] PLAN / Product Type: *No Product type* /     Current Problem   1. Right knee pain, unspecified chronicity  Follow Up In Physical Therapy      2. Right hip pain  Follow Up In Physical Therapy          Subjective   General   Reason for Referral: R knee  Referred By: Dr. Crouch  General Comment: Pt reports R hip and knee pain on arrival.  Precautions:  Precautions  Precautions Comment: has trouble laying supine due to slipped discs in L spine  Pain   Pain Assessment: 0-10  0-10 (Numeric) Pain Score: 5 - Moderate pain  Pain Location: Hip  Pain Radiating Towards: r knee  Pain Frequency: Constant/continuous  Post Treatment Pain Level 4      Treatments:  Therapeutic Exercise:  Therapeutic Exercise  Therapeutic Exercise Performed: Yes  Therapeutic Exercise Activity 1: nu step 5min  Therapeutic Exercise Activity 2: slant boards 2x30 r/l  Therapeutic Exercise Activity 3: BArs stnding hip flex x20  Therapeutic Exercise Activity 4: bar partial squat 2x10  Therapeutic Exercise Activity 5: bar hip abd 2x10  Therapeutic Exercise Activity 6: LAQ w ab brace 2x10  Therapeutic Exercise Activity 7: bridging 2x10  Therapeutic Exercise Activity 10: sit-stand x10      Gait:  P bars-  Heel/toe gait x 4  Side stepping x 4  Retro gait X 4      Assessment   Assessment:   PT Assessment  PT Assessment Results: Decreased strength, Decreased range of motion, Decreased endurance, Decreased mobility, Decreased coordination, Pain  Rehab Prognosis: Good  Assessment Comment: Pt tolerates ther ex progressions with  mild recution in S/S.    Plan:   OP PT Plan  Treatment/Interventions: Blood flow restriction therapy, Cryotherapy, Dry needling, Education/ Instruction, Hot pack, Manual therapy, Neuromuscular re-education, Taping techniques, Therapeutic activities, Therapeutic exercises  PT Plan: Skilled PT  PT Frequency: 1 time per week  Duration: 2-3 weeks + eval  Onset Date: 06/18/24  Number of Treatments Authorized: Auth Required  Rehab Potential: Good  Plan of Care Agreement: Patient    OP EDUCATION:  Outpatient Education  Individual(s) Educated: Patient  Education Provided: Body Mechanics, Anatomy, Home Exercise Program  Patient/Caregiver Demonstrated Understanding: yes  Patient Response to Education: Patient/Caregiver Verbalized Understanding of Information, Patient/Caregiver Performed Return Demonstration of Exercises/Activities, Patient/Caregiver Asked Appropriate Questions    Goals:   Active       PT Problem       PT Goal 1 (Progressing)       Start:  06/18/24    Expected End:  07/23/24       Pt will be 50% IND with HEP in 4 weeks in order to progress with therapy.          PT Goal 2 (Progressing)       Start:  06/18/24    Expected End:  07/23/24       Pt will demonstrate subjective improvement of ADLs and recreational activities through improved score of 65 on LEFS in 4 weeks for household and community ambulation.          PT Goal 3 (Progressing)       Start:  06/18/24    Expected End:  07/23/24       Pt will improve R hip abductor strength to 3+/5 in 4 weeks in order to improve transfers, ambulation and stair negotiation           PT Goal 4 (Progressing)       Start:  06/18/24    Expected End:  07/23/24       Pt will be able to perform sit<>stand from normal chair height with UE assist PRN in 4 weeks to improve car, toilet, and daily transfer needs.             PT Problem       PT Goal 1 (Progressing)       Start:  06/18/24    Expected End:  08/22/24       Pt will be 100% IND with HEP in 8 weeks in order to maintain  progress with therapy.           PT Goal 2 (Progressing)       Start:  06/18/24    Expected End:  08/22/24       Pt will demonstrate subjective improvement of ADLs and recreational activities through improved score of 70 on LEFS in 8 weeks for community and daily tasks.           PT Goal 3 (Progressing)       Start:  06/18/24    Expected End:  08/22/24       Pt will improve R hip abductor strength to 4+/5 in 8 weeks in order to improve transfers, ambulation and stair negotiation            PT Goal 4 (Progressing)       Start:  06/18/24    Expected End:  08/22/24       Pt will be able to perform sit<>stand from normal chair height without UE assist in 8 weeks to improve car, toilet, and daily tasks.           PT Goal 5 (Progressing)       Start:  06/18/24    Expected End:  08/22/24       Pt will be able to perform community ambulation demonstrating normalized gait pattern in 8 weeks for community ambulation needs and reduced strain through joint structures to prevent further injury.          Patient Stated Goal 1 (Progressing)       Start:  06/18/24    Expected End:  08/22/24       Patient will report pain level no higher than 2/10 in R hip in 8 weeks.

## 2024-07-27 PROCEDURE — RXMED WILLOW AMBULATORY MEDICATION CHARGE

## 2024-07-29 ENCOUNTER — APPOINTMENT (OUTPATIENT)
Dept: PAIN MEDICINE | Facility: CLINIC | Age: 59
End: 2024-07-29
Payer: MEDICAID

## 2024-07-29 ENCOUNTER — OFFICE VISIT (OUTPATIENT)
Dept: CARDIOLOGY | Facility: CLINIC | Age: 59
End: 2024-07-29
Payer: MEDICAID

## 2024-07-29 VITALS
SYSTOLIC BLOOD PRESSURE: 124 MMHG | BODY MASS INDEX: 33.33 KG/M2 | DIASTOLIC BLOOD PRESSURE: 79 MMHG | HEART RATE: 68 BPM | WEIGHT: 239 LBS | OXYGEN SATURATION: 96 %

## 2024-07-29 DIAGNOSIS — M47.816 OSTEOARTHRITIS OF CERVICAL AND LUMBAR SPINE: ICD-10-CM

## 2024-07-29 DIAGNOSIS — M16.11 PRIMARY OSTEOARTHRITIS OF RIGHT HIP: ICD-10-CM

## 2024-07-29 DIAGNOSIS — J45.20 MILD INTERMITTENT ASTHMA WITHOUT COMPLICATION (HHS-HCC): Primary | ICD-10-CM

## 2024-07-29 DIAGNOSIS — M47.816 LUMBAR SPONDYLOSIS: Primary | ICD-10-CM

## 2024-07-29 DIAGNOSIS — M47.812 CERVICAL SPONDYLOSIS: ICD-10-CM

## 2024-07-29 DIAGNOSIS — M47.812 OSTEOARTHRITIS OF CERVICAL AND LUMBAR SPINE: ICD-10-CM

## 2024-07-29 DIAGNOSIS — M54.50 LUMBAR BACK PAIN: ICD-10-CM

## 2024-07-29 PROCEDURE — 3074F SYST BP LT 130 MM HG: CPT | Performed by: INTERNAL MEDICINE

## 2024-07-29 PROCEDURE — 99214 OFFICE O/P EST MOD 30 MIN: CPT | Performed by: INTERNAL MEDICINE

## 2024-07-29 PROCEDURE — 3078F DIAST BP <80 MM HG: CPT | Performed by: INTERNAL MEDICINE

## 2024-07-29 PROCEDURE — 99214 OFFICE O/P EST MOD 30 MIN: CPT | Performed by: PHYSICAL MEDICINE & REHABILITATION

## 2024-07-29 ASSESSMENT — PAIN SCALES - GENERAL: PAINLEVEL: 0-NO PAIN

## 2024-07-29 ASSESSMENT — ENCOUNTER SYMPTOMS
OCCASIONAL FEELINGS OF UNSTEADINESS: 0
DEPRESSION: 0
LOSS OF SENSATION IN FEET: 0

## 2024-07-29 NOTE — ASSESSMENT & PLAN NOTE
Paroxysmal atrial fibrillation well-managed with a combination of moderate dose metoprolol, low-dose flecainide and on chronic anticoagulation with Eliquis.    EKG shows sinus rhythm with normal axis and intervals and normal QTc interval

## 2024-07-29 NOTE — PROGRESS NOTES
Subjective      Chief Complaint   Patient presents with    Hospital Follow-up    Dizziness        she was in the emergency room in June 2024 with atypical chest pain syndrome worse if she took a deep breath but not related to physical activity, and nonischemic EKG during that visit.  Her chest x-ray showed no acute cardiopulmonary processes.  She has been well since that visit without angina.  She did have symptoms suggestive of possible variant angina so I placed her on amlodipine which seemed to reduce her chest pain episodes and maintain for treatment of variant angina and hypertension.    She had previous coronary calcium scoring June 26, 2023 with a 0 score meaning 0% 10-year risk of coronary ischemia.    She had a previous history of paroxysmal atrial fibrillation that has not been recurrent in the presence of metoprolol, flecainide, and she remains on Eliquis anticoagulation.         Review of Systems   All other systems reviewed and are negative.       Objective   Physical Exam  Constitutional:       Appearance: Normal appearance.   HENT:      Head: Normocephalic and atraumatic.   Eyes:      Pupils: Pupils are equal, round, and reactive to light.   Cardiovascular:      Rate and Rhythm: Normal rate and regular rhythm.      Pulses: Normal pulses.      Heart sounds: Normal heart sounds.   Pulmonary:      Effort: Pulmonary effort is normal.      Breath sounds: Normal breath sounds.   Abdominal:      General: Abdomen is flat. Bowel sounds are normal.      Palpations: Abdomen is soft.   Musculoskeletal:         General: Normal range of motion.      Cervical back: Normal range of motion.   Skin:     General: Skin is warm and dry.   Neurological:      General: No focal deficit present.   Psychiatric:         Mood and Affect: Mood normal.         Judgment: Judgment normal.          Lab Review:   Not applicable    Chest pain  No clinical signs of angina since her last visit.  Her June 2024 chest pain syndrome sounded  more consistent with costochondritis than  cardiac etiology.    A-fib (Multi)      Paroxysmal atrial fibrillation well-managed with a combination of moderate dose metoprolol, low-dose flecainide and on chronic anticoagulation with Eliquis.    EKG shows sinus rhythm with normal axis and intervals and normal QTc interval    Asthma (HHS-HCC)  She has a feeling that she is more short of breath in the summer months and she does have a history of longstanding asthma without a pulmonologist so I am going to refer her to Dr. Pinto/Dr. Narvaez    Essential hypertension  For some of her complaints of dizziness I suggested that she keep a blood pressure diary in the morning and in the evening to see if blood pressures are in any way related to her symptoms.    She is angina free and she has a coronary artery calcium score of 0 suggesting low likelihood of coronary artery disease as a cause of her symptoms.    3-month follow-up visit with me.

## 2024-07-29 NOTE — ASSESSMENT & PLAN NOTE
She has a feeling that she is more short of breath in the summer months and she does have a history of longstanding asthma without a pulmonologist so I am going to refer her to Dr. Pinto/Dr. Narvaez

## 2024-07-29 NOTE — ASSESSMENT & PLAN NOTE
No clinical signs of angina since her last visit.  Her June 2024 chest pain syndrome sounded more consistent with costochondritis than  cardiac etiology.

## 2024-07-29 NOTE — ASSESSMENT & PLAN NOTE
For some of her complaints of dizziness I suggested that she keep a blood pressure diary in the morning and in the evening to see if blood pressures are in any way related to her symptoms.    She is angina free and she has a coronary artery calcium score of 0 suggesting low likelihood of coronary artery disease as a cause of her symptoms.    3-month follow-up visit with me.

## 2024-07-29 NOTE — PROGRESS NOTES
Chief complaint  Back and R hip pain   Left sided neck pain     History  Lili Doe is referred  for initial pain management office visit  She started to have pain years ago for hte lower back lately having pain in the r hip and l neck  She works in school cafeteria 4 hours a day and will be toing to return to work with back to school  Lower back is is across the lower spine with flexion and extension worse with flexion. The pain in the back is deep achy worse in the mid back area.  This is associated with tight muscle bands.  This limits the range of motion of the lumbar spine mainly in forward flexion.  The pain in the back is radiating to the buttocks. The pain  worsens with bending forward or with any lifting, it improves with laying on the side and resting.      The pain in the groin is worse with standing and loading. The pain in the right  hip  is deep achy stabbing.  It is both on the anterior and posterior aspects and in the right groin.  The hip  pain is associated with sensation of crunching upon range of motion and weightbearing.  The pain is continuous at rest associated with stiffness with prolonged sitting and get worse with standing walking or any weightbearing activity.  Occasionally there is a feeling of fullness .  No change in color or texture of the skin.  No pain in the distal thigh or distal to the leg associated with the hip pain.  With episodes of aggravation of the pain there are occasion of sensation of instability but no falls.    Also pain in the left neck. The pain at the base of the neck on the left side is deep achy with stabbing sensation worse with extension and bending to the neck to the left.  It eases up with leaning forward and bending to the left.  The worst movement is with extension of the neck combined with rotation to the left side.  It eases up as above.  The pain is associated with tight muscle bands overlying the left lower cervical area.  No reported radicular  symptoms to the upper limb.  No reported weakness in the lower limbs or gait disturbance and no reported bowel or bladder incontinence.  No sensorimotor deficits in the upper or in lower limbs.     Had spine injection 2008 and does not want to have those again bc of side effect from steroid    She had Xray and had pt and that is helping only minimally    Pain level rest is 3 to 4 10 , with the aggravation ,  pain level 6/10.      The pain is interfering with activities of daily living, quality of life and quality of sleep. It is limiting the functions and everything takes longer to complete because of the slowing related to the pain. Movements are cautious to avoid aggravation of the symptoms.      Review of Systems :  Denied any fever or chills. No weight loss and no night sweats. No cough or sputum production. No diarrhea   The constipation has been responding to fibers and over the counter medications.     No bladder and bowel incontinence and no other changes in bladder and bowel. No skin changes.  Reports tiredness and fatigability only if the pain is not controlled.          Physical examination  Awake, alert and oriented for time place and persons   declined Chaperone for the visit and was adequately  draped for the exam.    Limping on the R side no cane    Examination of the cervical spine showed tight muscle bands over the left lower cervical facet area.  Verduzco test was positive with stabilization of the left C5C6 and C6C7 with rotation of the neck to the left combined with extension increased the pain.  The pain eased up with leaning forward.  Negative Spurling maneuver was negative for cervical root tension sign and no pain down to the left upper limb.  No sensorimotor deficits in the upper limbs.  Deep tendon reflexes in the upper limbs are 2/4 for the left biceps triceps and brachioradialis.  No hyperreflexia in the lower limbs.  Plantar cutaneous are downgoing on both sides.        Examination of the  lumbar spine showed mild reversal of the lumbar lordosis .    Tight muscle bands over the   lower lumbar paraspinals area.  Verduzco test on the   lower lumbar area increases the pain with stabilization of the lower lumbar facets bilaterally at  L45 and L5S1,  combined with extension and rotation of the lumbar spine to the eith side reproduced and worsened the back pain on that side.  The pain improved with leaning forward.  Straight leg raising was negative.  No sensorimotor deficits in the lower limbs.  Crispin testing were negative for axial loading and log rotation.  No aberrant pain behavior.      Examination of the right hip pain with range of motion mainly with internal rotation. Normal skin color and texture.  There is a  sensation of crepitation upon range of motion.  Range of motion from 5  degrees internal rotation increases the pain . No contracture. . No medial lateral instability.    Homans' sign is negative.  Calves are soft.  Hip flexion and extension is 4/5 and limited by the pain.   No aberrant pain behavior.    Diagnosis  Problem List Items Addressed This Visit       Primary osteoarthritis of right hip    Lumbar back pain    Lumbar spondylosis - Primary    Cervical spondylosis        Plan  Reviewed the pain generators.  Went over the types of pain with neuropathic and nociceptive and different pathologies and therapeutic modalities. Discussed the mechanism of action of interventions from acupuncture, physical therapy , regular exercises, injections, botox, spinal cord stimulation, and role of surgery     Went over pathology of the intervertebral disc displacement and cervical facet function and dysfunction and the anatomical relation to the Nerve roots and relation to the radicular symptoms. Went over treatment modalities with conservative treatment including acupuncture   and epidural steroid injection with fluoroscopy guidance and last resort of surgery    Dw her about the above findings  Went over  facet blocks on a diagnostic basis and if concordant pain relief, will consider Rfa. Went over the two procedures  We can have that for the lumbar and cervical facets   Also dw her about hip intraarticular steroid injection    She is going to think about those and let us know    Also continue with Home exercises program      She on Motrin 800 and dw about the side effects  from that especially that she is on BP meds and on Elaquis and she has Afib.    Discussed about NSAIDS and I explained about the opioids sparing effect to allow keeping the opioids dose at minimal effective dose.   I went over the potential side effects of the NSAIDS on the gastrointestinal, renal and cardiovascular systems.      I detailed the side effects from the acetaminophen in the medication and made aware of those. I also explained about the cumulative effects on the organs and mainly the liver.       Follow-up after above or earlier if needed     The level of clinical decision making in this office visit,  is high, given the high risks of complications with the morbidity and mortality due to the fact that acute and chronic pain may pose a threat to life and bodily function, if under treated, poorly treated, or with failure to maintain adequate treatment and timely medical follow up. Additionally over treatment has its own set of complications including overdosing on the pain medications and also the habit forming potentials with the use of the medications used to treat chronic painful conditions including therapeutic classes classified as dangerous medications. Given the serious and fluctuating nature of pain level and instensity with extensive consideration for whenever pain changes, there is always the risk of prolonged functional impairment requiring close patient monitoring with regular assessments and reassessments and high level medical decision making at every office visit. The amount and complexity of data reviewed is high given  the patient clinical presentation, labs,  data, radiology reports, and other tests as discussed during office visits. Pertinent data whether positive or negative were taken in consideration in the process of making this high level medical decision.

## 2024-07-30 ENCOUNTER — PHARMACY VISIT (OUTPATIENT)
Dept: PHARMACY | Facility: CLINIC | Age: 59
End: 2024-07-30
Payer: MEDICAID

## 2024-07-30 PROCEDURE — RXMED WILLOW AMBULATORY MEDICATION CHARGE

## 2024-07-30 RX ORDER — AMLODIPINE BESYLATE 5 MG/1
TABLET ORAL
Qty: 30 TABLET | Refills: 3 | OUTPATIENT
Start: 2024-07-30

## 2024-07-31 ENCOUNTER — PHARMACY VISIT (OUTPATIENT)
Dept: PHARMACY | Facility: CLINIC | Age: 59
End: 2024-07-31
Payer: MEDICAID

## 2024-08-01 ENCOUNTER — APPOINTMENT (OUTPATIENT)
Dept: PHYSICAL THERAPY | Facility: CLINIC | Age: 59
End: 2024-08-01
Payer: MEDICAID

## 2024-08-05 PROCEDURE — RXMED WILLOW AMBULATORY MEDICATION CHARGE

## 2024-08-06 ENCOUNTER — PHARMACY VISIT (OUTPATIENT)
Dept: PHARMACY | Facility: CLINIC | Age: 59
End: 2024-08-06
Payer: MEDICAID

## 2024-08-06 ENCOUNTER — TREATMENT (OUTPATIENT)
Dept: PHYSICAL THERAPY | Facility: CLINIC | Age: 59
End: 2024-08-06
Payer: MEDICAID

## 2024-08-06 DIAGNOSIS — M25.561 RIGHT KNEE PAIN, UNSPECIFIED CHRONICITY: ICD-10-CM

## 2024-08-06 DIAGNOSIS — M25.551 RIGHT HIP PAIN: ICD-10-CM

## 2024-08-06 PROCEDURE — 97140 MANUAL THERAPY 1/> REGIONS: CPT | Mod: GP

## 2024-08-06 PROCEDURE — 97110 THERAPEUTIC EXERCISES: CPT | Mod: GP

## 2024-08-06 NOTE — PROGRESS NOTES
Physical Therapy Treatment    Patient Name: Lili Doe  MRN: [id number]  Today's Date: 8/6/2024   [phone]    Insurance:  Visit number: 7 of 8  Authorization info: EVAL ONLY - [facility] COMM PLAN [facility] - F/U REQ AUTH / 100% COVERAGE / 30V/YR/THERAPY / REF: [id number] / ds 7/23/24.     Insurance Type: Payor: [employer] [facility] KILO / Plan: [place] [employer] [facility] PLAN / Product Type: *No Product type* /     Current Problem   1. Right knee pain, unspecified chronicity  Follow Up In Physical Therapy      2. Right hip pain  Follow Up In Physical Therapy          Subjective   General   Intermittent Right groin pain     Precautions: None     Pain    8-9/10 Intermittent R Groin    Pain Assessment: 0-10  0-10 (Numeric) Pain Score: 5 - Moderate pain  Pain Location: Hip  Pain Radiating Towards: r knee  Pain Frequency: Constant/continuous  Post Treatment Pain Level 4  Post Treatment Pain Level 3    Treatments:  Therapeutic Exercise:  Therapeutic Exercise Activity 1: nu step 5min  Therapeutic Exercise Activity 2: slant boards 2x30 r/l  Therapeutic Exercise Activity 3: Bars stnding hip flex x20  Therapeutic Exercise Activity 4: bar partial squat 2x10  Therapeutic Exercise Activity 5: bar hip abd 2x10  Therapeutic Exercise Activity 6: LAQ w ab brace 2x10  Therapeutic Exercise Activity 7: bridging 2x10  Therapeutic Exercise Activity 10: sit-stand x10   Inch worm light green x 4 (Pbars)  Supine ball add/squeeze 2x10  Gait:  P bars-  Heel/toe gait x 4  Side stepping x 4  Retro gait X 4     Manual:  R leg pull x 4min  Passive Right quad/hip flexor (supine and SL)    HEP - added supine, leg over table psoas stretch      Assessment   Assessment:    PT Assessment Results: Decreased strength, Decreased range of motion, Decreased endurance, Decreased mobility, Decreased coordination, Pain  Rehab Prognosis: Good  Assessment Comment: Pt tolerates ther ex progressions with mild recution in S/S.    Plan:    OP PT  Plan  Treatment/Interventions: Cryotherapy, Dry needling, Education/ Instruction, Hot pack, Manual therapy, Neuromuscular re-education, Taping techniques, Therapeutic activities, Therapeutic exercises  PT Plan: Skilled PT  PT Frequency: 1 time per week  Duration: 2-3 weeks + eval  Onset Date: 06/18/24  Number of Treatments Authorized: Auth Required  Rehab Potential: Good  Plan of Care Agreement: Patient  OP EDUCATION:       Goals:   Active       PT Problem       PT Goal 1 (Progressing)       Start:  06/18/24    Expected End:  07/23/24       Pt will be 50% IND with HEP in 4 weeks in order to progress with therapy.          PT Goal 2 (Progressing)       Start:  06/18/24    Expected End:  07/23/24       Pt will demonstrate subjective improvement of ADLs and recreational activities through improved score of 65 on LEFS in 4 weeks for household and community ambulation.          PT Goal 3 (Progressing)       Start:  06/18/24    Expected End:  07/23/24       Pt will improve R hip abductor strength to 3+/5 in 4 weeks in order to improve transfers, ambulation and stair negotiation           PT Goal 4 (Progressing)       Start:  06/18/24    Expected End:  07/23/24       Pt will be able to perform sit<>stand from normal chair height with UE assist PRN in 4 weeks to improve car, toilet, and daily transfer needs.             PT Problem       PT Goal 1 (Progressing)       Start:  06/18/24    Expected End:  08/22/24       Pt will be 100% IND with HEP in 8 weeks in order to maintain progress with therapy.           PT Goal 2 (Progressing)       Start:  06/18/24    Expected End:  08/22/24       Pt will demonstrate subjective improvement of ADLs and recreational activities through improved score of 70 on LEFS in 8 weeks for community and daily tasks.           PT Goal 3 (Progressing)       Start:  06/18/24    Expected End:  08/22/24       Pt will improve R hip abductor strength to 4+/5 in 8 weeks in order to improve transfers,  ambulation and stair negotiation            PT Goal 4 (Progressing)       Start:  06/18/24    Expected End:  08/22/24       Pt will be able to perform sit<>stand from normal chair height without UE assist in 8 weeks to improve car, toilet, and daily tasks.           PT Goal 5 (Progressing)       Start:  06/18/24    Expected End:  08/22/24       Pt will be able to perform community ambulation demonstrating normalized gait pattern in 8 weeks for community ambulation needs and reduced strain through joint structures to prevent further injury.          Patient Stated Goal 1 (Progressing)       Start:  06/18/24    Expected End:  08/22/24       Patient will report pain level no higher than 2/10 in R hip in 8 weeks.

## 2024-08-09 ENCOUNTER — HOSPITAL ENCOUNTER (OUTPATIENT)
Dept: RADIOLOGY | Facility: HOSPITAL | Age: 59
Discharge: HOME | End: 2024-08-09
Payer: MEDICAID

## 2024-08-09 VITALS — HEIGHT: 71 IN | BODY MASS INDEX: 33.6 KG/M2 | WEIGHT: 240 LBS

## 2024-08-09 DIAGNOSIS — Z12.31 ENCOUNTER FOR SCREENING MAMMOGRAM FOR MALIGNANT NEOPLASM OF BREAST: ICD-10-CM

## 2024-08-09 PROCEDURE — 77067 SCR MAMMO BI INCL CAD: CPT

## 2024-08-12 ENCOUNTER — PHARMACY VISIT (OUTPATIENT)
Dept: PHARMACY | Facility: CLINIC | Age: 59
End: 2024-08-12
Payer: MEDICAID

## 2024-08-12 PROCEDURE — RXMED WILLOW AMBULATORY MEDICATION CHARGE

## 2024-08-12 RX ORDER — TRIAMCINOLONE ACETONIDE 1 MG/G
CREAM TOPICAL
Qty: 454 G | Refills: 1 | OUTPATIENT
Start: 2024-08-12

## 2024-08-12 RX ORDER — ALBUTEROL SULFATE 0.83 MG/ML
SOLUTION RESPIRATORY (INHALATION)
Qty: 300 ML | Refills: 0 | OUTPATIENT
Start: 2024-08-12

## 2024-08-12 RX ORDER — LORAZEPAM 0.5 MG/1
TABLET ORAL
Qty: 14 TABLET | Refills: 0 | OUTPATIENT
Start: 2024-08-12

## 2024-08-21 PROCEDURE — RXMED WILLOW AMBULATORY MEDICATION CHARGE

## 2024-08-22 PROCEDURE — RXMED WILLOW AMBULATORY MEDICATION CHARGE

## 2024-08-26 ENCOUNTER — PHARMACY VISIT (OUTPATIENT)
Dept: PHARMACY | Facility: CLINIC | Age: 59
End: 2024-08-26
Payer: MEDICAID

## 2024-08-26 RX ORDER — ALBUTEROL SULFATE 90 UG/1
INHALANT RESPIRATORY (INHALATION)
Qty: 18 G | Refills: 2 | OUTPATIENT
Start: 2024-08-26

## 2024-09-04 PROCEDURE — RXMED WILLOW AMBULATORY MEDICATION CHARGE

## 2024-09-09 ENCOUNTER — PHARMACY VISIT (OUTPATIENT)
Dept: PHARMACY | Facility: CLINIC | Age: 59
End: 2024-09-09
Payer: MEDICAID

## 2024-09-09 PROCEDURE — RXMED WILLOW AMBULATORY MEDICATION CHARGE

## 2024-09-19 ENCOUNTER — OFFICE VISIT (OUTPATIENT)
Dept: PAIN MEDICINE | Facility: CLINIC | Age: 59
End: 2024-09-19
Payer: MEDICAID

## 2024-09-19 DIAGNOSIS — M47.816 LUMBAR SPONDYLOSIS: Primary | ICD-10-CM

## 2024-09-19 DIAGNOSIS — M16.11 PRIMARY OSTEOARTHRITIS OF RIGHT HIP: ICD-10-CM

## 2024-09-19 DIAGNOSIS — M54.17 LUMBOSACRAL NEURITIS: ICD-10-CM

## 2024-09-19 DIAGNOSIS — M51.26 DISC DISPLACEMENT, LUMBAR: ICD-10-CM

## 2024-09-19 PROCEDURE — 99213 OFFICE O/P EST LOW 20 MIN: CPT | Performed by: PHYSICAL MEDICINE & REHABILITATION

## 2024-09-19 NOTE — PROGRESS NOTES
Chief complaint  Still having pain in the back and [facility]    History  Lili Doe is back for pain management office visit  Still having pain in the back worse than the neck and R back is the worse  R hip is still also hurting her  The neck pain is also affecting her   The pain in the back is deep on the right side.  The pain is above the belt line, it is continuous but it gets worse with extension of the lumbar spine.  It improves with leaning forward.  Extension combined with rotation to the right side worsens the pain.  There are no reported radiation of the pain to the lower limbs.     No bowel or bladder incontinence.  No sensory or motor changes in the lower limbs.    No changes or in the color or textures of the skin of the lower limbs.        She thought about injections and did not want those      Pain level at rest    is 4/10 , with the aggravation   pain level 6/10.      Had pt and did not get relief tried topical   Has been back to work and pain getting wose    Review of Systems :  Denied any fever or chills. No weight loss and no night sweats. No cough or sputum production. No diarrhea   The constipation has been responding to fibers and over the counter medications.     No bladder and bowel incontinence and no other changes in bladder and bowel. No skin changes.  Reports tiredness and fatigability only if the pain is not controlled.          Physical examination  Awake, alert and oriented for time place and persons   declined Chaperone for the visit and was adequately  draped for the exam.  Examination of the lumbar spine showed mild reversal of the lumbar lordosis with slight scoliosis with right-sided concavity.  The scoliosis slightly corrected upon bending of the lumbar spine.  Tight muscle bands over the right lower lumbar area.  Verduzco test on the right lower lumbar area increases the pain with stabilization of the lower lumbar facets at L34, L45 and L5S1,  combined with extension and rotation  of the lumbar spine to the right side reproduced and worsened the back pain.  The pain improved with leaning forward.  Straight leg raising was negative.  No sensorimotor deficits in the lower limbs.  Crispin testing were negative for axial loading and log rotation.  No aberrant pain behavior.     Examination of the right hip pain with range of motion mainly with internal rotation. Normal skin color and texture.  There is a  sensation of crepitation upon range of motion.  Range of motion from 5  degrees internal rotation increases the pain . No contracture. . No medial lateral instability.    Homans' sign is negative.  Calves are soft.  Hip flexion and extension is 4/5 and limited by the pain.   No aberrant pain behavior.     Diagnosis  Problem List Items Addressed This Visit       Lumbosacral neuritis    Disc displacement, lumbar    Primary osteoarthritis of right hip    Lumbar spondylosis - Primary        Plan  Reviewed the pain generators.  Went over the types of pain with neuropathic and nociceptive and different pathologies and therapeutic modalities. Discussed the mechanism of action of interventions from acupuncture, physical therapy , regular exercises, injections, botox, spinal cord stimulation, and role of surgery     Went over pathology of the intervertebral disc displacement and the anatomical relation to the Nerve roots and relation to the radicular symptoms. Went over treatment modalities with conservative treatment including acupuncture   and epidural steroid injection with fluoroscopy guidance and last resort of surgery        Does not want to consider injection I referred her to surgery  Asked for pain meds and explained to her that I am not taking new patients for opioids maintenance.    Discussed about NSAIDS and I explained about the opioids sparing effect to allow keeping the opioids dose at minimal effective dose.   I went over the potential side effects of the NSAIDS on the gastrointestinal,  renal and cardiovascular systems.      I detailed the side effects from the acetaminophen in the medication and made aware of those. I also explained about the cumulative effects on the organs and mainly the liver.        Follow-up after above or earlier if needed     The level of clinical decision making in this office visit,  is high, given the high risks of complications with the morbidity and mortality due to the fact that acute and chronic pain may pose a threat to life and bodily function, if under treated, poorly treated, or with failure to maintain adequate treatment and timely medical follow up. Additionally over treatment has its own set of complications including overdosing on the pain medications and also the habit forming potentials with the use of the medications used to treat chronic painful conditions including therapeutic classes classified as dangerous medications. Given the serious and fluctuating nature of pain level and instensity with extensive consideration for whenever pain changes, there is always the risk of prolonged functional impairment requiring close patient monitoring with regular assessments and reassessments and high level medical decision making at every office visit. The amount and complexity of data reviewed is high given the patient clinical presentation, labs,  data, radiology reports, and other tests as discussed during office visits. Pertinent data whether positive or negative were taken in consideration in the process of making this high level medical decision.

## 2024-09-21 PROCEDURE — RXMED WILLOW AMBULATORY MEDICATION CHARGE

## 2024-09-23 PROCEDURE — RXMED WILLOW AMBULATORY MEDICATION CHARGE

## 2024-09-24 PROCEDURE — RXMED WILLOW AMBULATORY MEDICATION CHARGE

## 2024-09-25 PROCEDURE — RXMED WILLOW AMBULATORY MEDICATION CHARGE

## 2024-09-27 ENCOUNTER — DOCUMENTATION (OUTPATIENT)
Dept: PHYSICAL THERAPY | Facility: CLINIC | Age: 59
End: 2024-09-27
Payer: MEDICAID

## 2024-09-27 ENCOUNTER — PHARMACY VISIT (OUTPATIENT)
Dept: PHARMACY | Facility: CLINIC | Age: 59
End: 2024-09-27
Payer: MEDICAID

## 2024-09-27 PROCEDURE — RXMED WILLOW AMBULATORY MEDICATION CHARGE

## 2024-09-27 RX ORDER — AMLODIPINE BESYLATE 5 MG/1
TABLET ORAL
Qty: 30 TABLET | Refills: 3 | Status: CANCELLED | OUTPATIENT
Start: 2024-09-27

## 2024-09-27 NOTE — PROGRESS NOTES
Physical Therapy    Discharge Summary    Name: Lili Doe  MRN: [id number]  : 1965  Date: 24    Discharge Summary: PT    Discharge Information: Date of discharge 24, Date of last visit 24, and Date of evaluation 24    Therapy Summary: Pt did not report large improvement since evaluation, failed to continue with therapy sessions.     Discharge Status: goals slowly progressing     Rehab Discharge Reason: Failed to schedule and/or keep follow-up appointment(s)

## 2024-09-30 ENCOUNTER — PHARMACY VISIT (OUTPATIENT)
Dept: PHARMACY | Facility: CLINIC | Age: 59
End: 2024-09-30
Payer: MEDICAID

## 2024-09-30 PROCEDURE — RXMED WILLOW AMBULATORY MEDICATION CHARGE

## 2024-09-30 RX ORDER — LORAZEPAM 0.5 MG/1
TABLET ORAL
Qty: 14 TABLET | Refills: 0 | OUTPATIENT
Start: 2024-09-30

## 2024-09-30 RX ORDER — METOPROLOL SUCCINATE 100 MG/1
100 TABLET, EXTENDED RELEASE ORAL DAILY
Qty: 90 TABLET | Refills: 1 | OUTPATIENT
Start: 2024-09-30

## 2024-09-30 RX ORDER — VIT C/E/ZN/COPPR/LUTEIN/ZEAXAN 250MG-90MG
50 CAPSULE ORAL DAILY
Qty: 180 CAPSULE | Refills: 1 | OUTPATIENT
Start: 2024-09-30

## 2024-09-30 RX ORDER — CONJUGATED ESTROGENS 0.62 MG/G
CREAM VAGINAL
Qty: 30 G | Refills: 2 | OUTPATIENT
Start: 2024-09-30

## 2024-09-30 RX ORDER — FLECAINIDE ACETATE 50 MG/1
50 TABLET ORAL 2 TIMES DAILY
Qty: 180 TABLET | Refills: 1 | OUTPATIENT
Start: 2024-09-30

## 2024-09-30 RX ORDER — HYDROCHLOROTHIAZIDE 12.5 MG/1
12.5 TABLET ORAL
Qty: 90 TABLET | Refills: 0 | OUTPATIENT
Start: 2024-09-30

## 2024-09-30 RX ORDER — AMLODIPINE BESYLATE 5 MG/1
5 TABLET ORAL DAILY
Qty: 90 TABLET | Refills: 1 | OUTPATIENT
Start: 2024-09-30

## 2024-09-30 RX ORDER — FLUTICASONE PROPIONATE 50 MCG
2 SPRAY, SUSPENSION (ML) NASAL DAILY
Qty: 48 G | Refills: 3 | OUTPATIENT
Start: 2024-09-30

## 2024-09-30 RX ORDER — APIXABAN 5 MG/1
5 TABLET, FILM COATED ORAL 2 TIMES DAILY
Qty: 180 TABLET | Refills: 1 | OUTPATIENT
Start: 2024-09-30

## 2024-09-30 RX ORDER — GABAPENTIN 300 MG/1
CAPSULE ORAL
Qty: 90 CAPSULE | Refills: 2 | OUTPATIENT
Start: 2024-09-30

## 2024-09-30 RX ORDER — ALBUTEROL SULFATE 90 UG/1
2 INHALANT RESPIRATORY (INHALATION) EVERY 4 HOURS PRN
Qty: 18 G | Refills: 2 | OUTPATIENT
Start: 2024-09-30

## 2024-10-15 PROCEDURE — RXMED WILLOW AMBULATORY MEDICATION CHARGE

## 2024-10-16 PROCEDURE — RXMED WILLOW AMBULATORY MEDICATION CHARGE

## 2024-10-17 ENCOUNTER — PHARMACY VISIT (OUTPATIENT)
Dept: PHARMACY | Facility: CLINIC | Age: 59
End: 2024-10-17
Payer: MEDICAID

## 2024-10-21 PROCEDURE — RXMED WILLOW AMBULATORY MEDICATION CHARGE

## 2024-10-23 PROCEDURE — RXMED WILLOW AMBULATORY MEDICATION CHARGE

## 2024-10-28 PROCEDURE — RXMED WILLOW AMBULATORY MEDICATION CHARGE

## 2024-10-30 PROCEDURE — RXMED WILLOW AMBULATORY MEDICATION CHARGE

## 2024-10-31 PROCEDURE — RXMED WILLOW AMBULATORY MEDICATION CHARGE

## 2024-11-04 PROCEDURE — RXMED WILLOW AMBULATORY MEDICATION CHARGE

## 2024-11-05 ENCOUNTER — PHARMACY VISIT (OUTPATIENT)
Dept: PHARMACY | Facility: CLINIC | Age: 59
End: 2024-11-05
Payer: MEDICAID

## 2024-11-14 PROCEDURE — RXMED WILLOW AMBULATORY MEDICATION CHARGE

## 2024-11-18 PROCEDURE — RXMED WILLOW AMBULATORY MEDICATION CHARGE

## 2024-11-19 PROCEDURE — RXMED WILLOW AMBULATORY MEDICATION CHARGE

## 2024-11-26 ENCOUNTER — PHARMACY VISIT (OUTPATIENT)
Dept: PHARMACY | Facility: CLINIC | Age: 59
End: 2024-11-26
Payer: MEDICAID

## 2024-11-29 PROCEDURE — RXMED WILLOW AMBULATORY MEDICATION CHARGE

## 2024-12-03 ENCOUNTER — PHARMACY VISIT (OUTPATIENT)
Dept: PHARMACY | Facility: CLINIC | Age: 59
End: 2024-12-03
Payer: MEDICAID

## 2024-12-07 PROCEDURE — RXMED WILLOW AMBULATORY MEDICATION CHARGE

## 2024-12-10 PROCEDURE — RXMED WILLOW AMBULATORY MEDICATION CHARGE

## 2024-12-20 PROCEDURE — RXMED WILLOW AMBULATORY MEDICATION CHARGE

## 2024-12-23 ENCOUNTER — PHARMACY VISIT (OUTPATIENT)
Dept: PHARMACY | Facility: CLINIC | Age: 59
End: 2024-12-23
Payer: MEDICAID

## 2024-12-28 PROCEDURE — RXMED WILLOW AMBULATORY MEDICATION CHARGE

## 2025-01-03 PROCEDURE — RXMED WILLOW AMBULATORY MEDICATION CHARGE

## 2025-01-07 ENCOUNTER — PHARMACY VISIT (OUTPATIENT)
Dept: PHARMACY | Facility: CLINIC | Age: 60
End: 2025-01-07
Payer: MEDICAID

## 2025-01-17 ENCOUNTER — PHARMACY VISIT (OUTPATIENT)
Dept: PHARMACY | Facility: CLINIC | Age: 60
End: 2025-01-17
Payer: COMMERCIAL

## 2025-01-17 PROCEDURE — RXMED WILLOW AMBULATORY MEDICATION CHARGE

## 2025-01-17 RX ORDER — ALBUTEROL SULFATE 90 UG/1
2 INHALANT RESPIRATORY (INHALATION) EVERY 4 HOURS PRN
Qty: 18 G | Refills: 2 | OUTPATIENT
Start: 2025-01-17

## 2025-01-20 PROCEDURE — RXMED WILLOW AMBULATORY MEDICATION CHARGE

## 2025-01-21 ENCOUNTER — APPOINTMENT (OUTPATIENT)
Dept: CARDIOLOGY | Facility: HOSPITAL | Age: 60
End: 2025-01-21
Payer: MEDICARE

## 2025-01-21 ENCOUNTER — HOSPITAL ENCOUNTER (EMERGENCY)
Facility: HOSPITAL | Age: 60
Discharge: HOME | End: 2025-01-21
Payer: MEDICARE

## 2025-01-21 ENCOUNTER — APPOINTMENT (OUTPATIENT)
Dept: RADIOLOGY | Facility: HOSPITAL | Age: 60
End: 2025-01-21
Payer: MEDICARE

## 2025-01-21 VITALS
HEIGHT: 71 IN | WEIGHT: 240 LBS | RESPIRATION RATE: 15 BRPM | DIASTOLIC BLOOD PRESSURE: 67 MMHG | HEART RATE: 75 BPM | TEMPERATURE: 98.6 F | SYSTOLIC BLOOD PRESSURE: 155 MMHG | OXYGEN SATURATION: 98 % | BODY MASS INDEX: 33.6 KG/M2

## 2025-01-21 DIAGNOSIS — J20.9 ACUTE BRONCHITIS, UNSPECIFIED ORGANISM: ICD-10-CM

## 2025-01-21 DIAGNOSIS — J45.901 EXACERBATION OF ASTHMA, UNSPECIFIED ASTHMA SEVERITY, UNSPECIFIED WHETHER PERSISTENT (HHS-HCC): Primary | ICD-10-CM

## 2025-01-21 LAB
ANION GAP SERPL CALCULATED.3IONS-SCNC: 9 MMOL/L (ref 10–20)
BASOPHILS # BLD AUTO: 0.03 X10*3/UL (ref 0–0.1)
BASOPHILS NFR BLD AUTO: 0.4 %
BNP SERPL-MCNC: 17 PG/ML (ref 0–99)
BUN SERPL-MCNC: 16 MG/DL (ref 6–23)
CALCIUM SERPL-MCNC: 9.4 MG/DL (ref 8.6–10.3)
CARDIAC TROPONIN I PNL SERPL HS: <3 NG/L (ref 0–13)
CARDIAC TROPONIN I PNL SERPL HS: <3 NG/L (ref 0–13)
CHLORIDE SERPL-SCNC: 104 MMOL/L (ref 98–107)
CO2 SERPL-SCNC: 31 MMOL/L (ref 21–32)
CREAT SERPL-MCNC: 0.85 MG/DL (ref 0.5–1.05)
EGFRCR SERPLBLD CKD-EPI 2021: 80 ML/MIN/1.73M*2
EOSINOPHIL # BLD AUTO: 0.23 X10*3/UL (ref 0–0.7)
EOSINOPHIL NFR BLD AUTO: 3.4 %
ERYTHROCYTE [DISTWIDTH] IN BLOOD BY AUTOMATED COUNT: 15.6 % (ref 11.5–14.5)
FLUAV RNA RESP QL NAA+PROBE: NOT DETECTED
FLUBV RNA RESP QL NAA+PROBE: NOT DETECTED
GLUCOSE SERPL-MCNC: 115 MG/DL (ref 74–99)
HCT VFR BLD AUTO: 42.5 % (ref 36–46)
HGB BLD-MCNC: 14.1 G/DL (ref 12–16)
IMM GRANULOCYTES # BLD AUTO: 0.03 X10*3/UL (ref 0–0.7)
IMM GRANULOCYTES NFR BLD AUTO: 0.4 % (ref 0–0.9)
LYMPHOCYTES # BLD AUTO: 1.67 X10*3/UL (ref 1.2–4.8)
LYMPHOCYTES NFR BLD AUTO: 24.5 %
MAGNESIUM SERPL-MCNC: 1.91 MG/DL (ref 1.6–2.4)
MCH RBC QN AUTO: 29.4 PG (ref 26–34)
MCHC RBC AUTO-ENTMCNC: 33.2 G/DL (ref 32–36)
MCV RBC AUTO: 89 FL (ref 80–100)
MONOCYTES # BLD AUTO: 0.35 X10*3/UL (ref 0.1–1)
MONOCYTES NFR BLD AUTO: 5.1 %
NEUTROPHILS # BLD AUTO: 4.5 X10*3/UL (ref 1.2–7.7)
NEUTROPHILS NFR BLD AUTO: 66.2 %
NRBC BLD-RTO: 0 /100 WBCS (ref 0–0)
PLATELET # BLD AUTO: 233 X10*3/UL (ref 150–450)
POTASSIUM SERPL-SCNC: 3.4 MMOL/L (ref 3.5–5.3)
RBC # BLD AUTO: 4.8 X10*6/UL (ref 4–5.2)
SARS-COV-2 RNA RESP QL NAA+PROBE: NOT DETECTED
SODIUM SERPL-SCNC: 141 MMOL/L (ref 136–145)
WBC # BLD AUTO: 6.8 X10*3/UL (ref 4.4–11.3)

## 2025-01-21 PROCEDURE — 71046 X-RAY EXAM CHEST 2 VIEWS: CPT

## 2025-01-21 PROCEDURE — 83735 ASSAY OF MAGNESIUM: CPT

## 2025-01-21 PROCEDURE — 87636 SARSCOV2 & INF A&B AMP PRB: CPT

## 2025-01-21 PROCEDURE — 71046 X-RAY EXAM CHEST 2 VIEWS: CPT | Mod: FOREIGN READ | Performed by: RADIOLOGY

## 2025-01-21 PROCEDURE — 83880 ASSAY OF NATRIURETIC PEPTIDE: CPT

## 2025-01-21 PROCEDURE — 93005 ELECTROCARDIOGRAM TRACING: CPT

## 2025-01-21 PROCEDURE — 99285 EMERGENCY DEPT VISIT HI MDM: CPT | Mod: 25

## 2025-01-21 PROCEDURE — 84484 ASSAY OF TROPONIN QUANT: CPT

## 2025-01-21 PROCEDURE — 36415 COLL VENOUS BLD VENIPUNCTURE: CPT

## 2025-01-21 PROCEDURE — 85025 COMPLETE CBC W/AUTO DIFF WBC: CPT

## 2025-01-21 PROCEDURE — 96374 THER/PROPH/DIAG INJ IV PUSH: CPT

## 2025-01-21 PROCEDURE — 94640 AIRWAY INHALATION TREATMENT: CPT

## 2025-01-21 PROCEDURE — 80048 BASIC METABOLIC PNL TOTAL CA: CPT

## 2025-01-21 PROCEDURE — 2500000002 HC RX 250 W HCPCS SELF ADMINISTERED DRUGS (ALT 637 FOR MEDICARE OP, ALT 636 FOR OP/ED): Mod: MUE

## 2025-01-21 PROCEDURE — 2500000004 HC RX 250 GENERAL PHARMACY W/ HCPCS (ALT 636 FOR OP/ED)

## 2025-01-21 RX ORDER — IPRATROPIUM BROMIDE AND ALBUTEROL SULFATE 2.5; .5 MG/3ML; MG/3ML
3 SOLUTION RESPIRATORY (INHALATION)
Status: COMPLETED | OUTPATIENT
Start: 2025-01-21 | End: 2025-01-21

## 2025-01-21 RX ORDER — IPRATROPIUM BROMIDE AND ALBUTEROL SULFATE 2.5; .5 MG/3ML; MG/3ML
3 SOLUTION RESPIRATORY (INHALATION)
Qty: 180 ML | Refills: 0 | Status: SHIPPED | OUTPATIENT
Start: 2025-01-21 | End: 2026-01-21

## 2025-01-21 RX ORDER — PREDNISONE 20 MG/1
40 TABLET ORAL DAILY
Qty: 10 TABLET | Refills: 0 | Status: SHIPPED | OUTPATIENT
Start: 2025-01-21 | End: 2025-01-27

## 2025-01-21 RX ADMIN — METHYLPREDNISOLONE SODIUM SUCCINATE 125 MG: 125 INJECTION, POWDER, FOR SOLUTION INTRAMUSCULAR; INTRAVENOUS at 16:52

## 2025-01-21 RX ADMIN — IPRATROPIUM BROMIDE AND ALBUTEROL SULFATE 3 ML: 2.5; .5 SOLUTION RESPIRATORY (INHALATION) at 17:07

## 2025-01-21 RX ADMIN — IPRATROPIUM BROMIDE AND ALBUTEROL SULFATE 3 ML: 2.5; .5 SOLUTION RESPIRATORY (INHALATION) at 17:09

## 2025-01-21 RX ADMIN — IPRATROPIUM BROMIDE AND ALBUTEROL SULFATE 3 ML: 2.5; .5 SOLUTION RESPIRATORY (INHALATION) at 16:39

## 2025-01-21 ASSESSMENT — PAIN - FUNCTIONAL ASSESSMENT: PAIN_FUNCTIONAL_ASSESSMENT: 0-10

## 2025-01-21 ASSESSMENT — LIFESTYLE VARIABLES
HAVE YOU EVER FELT YOU SHOULD CUT DOWN ON YOUR DRINKING: NO
HAVE PEOPLE ANNOYED YOU BY CRITICIZING YOUR DRINKING: NO
EVER FELT BAD OR GUILTY ABOUT YOUR DRINKING: NO
TOTAL SCORE: 0
EVER HAD A DRINK FIRST THING IN THE MORNING TO STEADY YOUR NERVES TO GET RID OF A HANGOVER: NO

## 2025-01-21 ASSESSMENT — PAIN SCALES - GENERAL: PAINLEVEL_OUTOF10: 0 - NO PAIN

## 2025-01-21 NOTE — ED PROVIDER NOTES
HPI   Chief Complaint   Patient presents with    Shortness of Breath     Pt states she has been feeling short of breath since Thursday, hx of asthma, pt has an inhaler and states it is not happening       Patient is a 59-year-old female presents emergency department for evaluation of shortness of breath, cough, congestion.  Patient states that since Thursday of last week she has had a lot of nasal drainage and phlegm.  She states that she has had increasing cough and congestion.  She states that she feels increased wheezing and dyspnea.  She notes a history of asthma, but does not have medication for her nebulizer machine at home.  She states she has been using her inhaler which has not significantly been helping.  She states that she is persisting of symptoms and is around multiple individuals were sick with similar symptoms.  She denies any associated chest pain, but notes history of atrial fibrillation on Eliquis.  She has had some intermittent chills with no fevers.  She denies any difficulty swallowing, lightheadedness, dizziness, nausea, vomiting, abdominal pain, or other symptoms at this time.      History provided by:  Patient   used: No            Patient History   Past Medical History:   Diagnosis Date    Asthma ([facility]-HCC)     Atrial fibrillation (Multi)     Hypertension     Neck pain     Right knee pain      Past Surgical History:   Procedure Laterality Date    CHOLECYSTECTOMY  1987     Family History   Problem Relation Name Age of Onset    Stroke Mother      Cataracts Mother      No Known Problems Father       Social History     Tobacco Use    Smoking status: Every Day     Current packs/day: 1.00     Average packs/day: 1 pack/day for 38.1 years (38.1 ttl pk-yrs)     Types: Cigarettes     Start date: 1987    Smokeless tobacco: Never   Vaping Use    Vaping status: Never Used   Substance Use Topics    Alcohol use: Yes     Comment: occasional    Drug use: Yes     Types: Marijuana        Physical Exam   ED Triage Vitals [01/21/25 1540]   Temperature Heart Rate Respirations BP   37 °C (98.6 °F) 70 16 143/71      Pulse Ox Temp src Heart Rate Source Patient Position   100 % -- -- Sitting      BP Location FiO2 (%)     Left arm --       Physical Exam  Constitutional:       Appearance: She is well-developed.   Cardiovascular:      Rate and Rhythm: Normal rate and regular rhythm.   Pulmonary:      Effort: Pulmonary effort is normal.      Breath sounds: Examination of the right-lower field reveals decreased breath sounds and wheezing. Examination of the left-lower field reveals decreased breath sounds and wheezing. Decreased breath sounds and wheezing present.   Abdominal:      General: Bowel sounds are normal.      Palpations: Abdomen is soft.      Tenderness: There is no abdominal tenderness.   Musculoskeletal:         General: Normal range of motion.   Skin:     General: Skin is warm and dry.   Neurological:      General: No focal deficit present.      Mental Status: She is alert and oriented to person, place, and time.           ED Course & MDM   ED Course as of 01/21/25 2342   Tue Jan 21, 2025   1552 Independently interpreted the results of the EKG and it showed normal sinus rhythm at 77 bpm, normal axis, normal voltage, normal ST segment, normal T waves [NG]      ED Course User Index  [NG] Maylin Kumar MD         Diagnoses as of 01/21/25 2342   Exacerbation of asthma, unspecified asthma severity, unspecified whether persistent ([facility])   Acute bronchitis, unspecified organism                 No data recorded     [place] Coma Scale Score: 15 (01/21/25 1738 : Rosario Ashby RN)                           Medical Decision Making  Patient is a 59-year-old female presents emergency department for evaluation of increasing shortness of breath with history of asthma with cough and congestion.    EKG was interpreted by attending physician and reviewed by me.    Lab work done today included BMP, CBC,  troponins, magnesium, and flu/COVID swabs.  Lab work with troponins within normal range, borderline hypokalemia and otherwise grossly unremarkable.    Scans done today were interpreted/confirmed by radiologist and also interpreted by me which included chest x-ray.  Chest x-ray shows no acute cardiopulmonary process.    Medications given at today's visit include IM Solu-Medrol, DuoNeb breathing treatment    I saw this patient independently.  Patient feeling improved medication given emergency department.  Vital signs remained stable and patient oxygenating well on room air.  EKG shows no evidence of ischemia and troponins within normal range with no evidence of elevation of proBNP.  Chest x-ray shows no evidence for acute cardiopulmonary process fluid effusion or infiltrate.  Patient has clinical symptoms, history, and physical exam consistent with viral syndrome and commendation with likely asthma exacerbation with acute bronchitis.  Given length of symptoms and relatively benign exam with stable vital signs, patient is able to be discharged home for supportive care outpatient.  There appears to be no evidence of any secondary bacterial infections including otitis media, pneumonia, exudative pharyngitis, CNS infections, or concern for widespread systemic infection such as sepsis.  Patient was educated about measures to take for symptom management including ibuprofen, Tylenol, and over-the-counter cold medications to help with symptoms.  Patient was advised to increase fluids to prevent any dehydration.  Patient will be started on short course of steroids to help with symptoms and given refills for her nebulizer treatment as this helped with her symptoms.  She is educated on the importance of close follow-up with her primary care provider and agreeable plan discharge at this time.  Emergent pathologies were considered for this patient, although I have low suspicion for anything acutely emergent given patient's  clinical presentation, history, physical exam, stable vital signs, and relatively unremarkable workup.  Discharging patient home is reasonable plan of care for outpatient management.    All labs, imaging, and diagnostic studies were reviewed by me and patient was counseled on clinical impression, expectations, and plan.  Patient was educated to follow-up with PCP in the following 1-2 days.  All questions from patient were answered. They elicited understanding and were agreeable to course of treatment.  Patient was discharged in stable condition and given strict return precautions.    Prescriptions given on discharge: P.o. prednisone, DuoNeb breathing treatments    ** Disclaimer:  Parts of this document were written utilizing a voice to text dictation software.  Note may contain minor transcription or typographical errors that were inadvertently transcribed by the computer software.        Procedure  Procedures     Preethi Payton PA-C  01/21/25 5434

## 2025-01-21 NOTE — DISCHARGE INSTRUCTIONS
Please follow close with your primary care fighter in the following week.  Medication sent to pharmacy for steroids to help with symptoms and refills of nebulizer treatments to help with symptoms.

## 2025-01-22 ENCOUNTER — PHARMACY VISIT (OUTPATIENT)
Dept: PHARMACY | Facility: CLINIC | Age: 60
End: 2025-01-22
Payer: COMMERCIAL

## 2025-01-22 LAB
ATRIAL RATE: 77 BPM
P AXIS: 80 DEGREES
P OFFSET: 199 MS
P ONSET: 138 MS
PR INTERVAL: 164 MS
Q ONSET: 220 MS
QRS COUNT: 13 BEATS
QRS DURATION: 72 MS
QT INTERVAL: 384 MS
QTC CALCULATION(BAZETT): 434 MS
QTC FREDERICIA: 417 MS
R AXIS: 58 DEGREES
T AXIS: 61 DEGREES
T OFFSET: 412 MS
VENTRICULAR RATE: 77 BPM

## 2025-01-22 PROCEDURE — RXMED WILLOW AMBULATORY MEDICATION CHARGE

## 2025-01-29 ENCOUNTER — PHARMACY VISIT (OUTPATIENT)
Dept: PHARMACY | Facility: CLINIC | Age: 60
End: 2025-01-29
Payer: MEDICARE

## 2025-01-29 PROCEDURE — RXMED WILLOW AMBULATORY MEDICATION CHARGE

## 2025-01-29 RX ORDER — ALBUTEROL SULFATE AND BUDESONIDE 90; 80 UG/1; UG/1
AEROSOL, METERED RESPIRATORY (INHALATION)
Qty: 10.7 G | Refills: 0 | OUTPATIENT
Start: 2025-01-29

## 2025-01-29 RX ORDER — BENZONATATE 100 MG/1
CAPSULE ORAL
Qty: 30 CAPSULE | Refills: 0 | OUTPATIENT
Start: 2025-01-29

## 2025-01-31 PROCEDURE — RXMED WILLOW AMBULATORY MEDICATION CHARGE

## 2025-02-03 ENCOUNTER — PHARMACY VISIT (OUTPATIENT)
Dept: PHARMACY | Facility: CLINIC | Age: 60
End: 2025-02-03
Payer: COMMERCIAL

## 2025-02-03 PROCEDURE — RXMED WILLOW AMBULATORY MEDICATION CHARGE

## 2025-02-03 RX ORDER — DOXYCYCLINE 100 MG/1
100 CAPSULE ORAL 2 TIMES DAILY
Qty: 14 CAPSULE | Refills: 0 | OUTPATIENT
Start: 2025-02-03

## 2025-02-10 DIAGNOSIS — J45.901 EXACERBATION OF ASTHMA, UNSPECIFIED ASTHMA SEVERITY, UNSPECIFIED WHETHER PERSISTENT (HHS-HCC): ICD-10-CM

## 2025-02-10 DIAGNOSIS — J20.9 ACUTE BRONCHITIS, UNSPECIFIED ORGANISM: ICD-10-CM

## 2025-02-10 RX ORDER — IPRATROPIUM BROMIDE AND ALBUTEROL SULFATE 2.5; .5 MG/3ML; MG/3ML
3 SOLUTION RESPIRATORY (INHALATION)
Qty: 180 ML | Refills: 0 | Status: CANCELLED | OUTPATIENT
Start: 2025-02-10 | End: 2026-02-10

## 2025-02-11 PROCEDURE — RXMED WILLOW AMBULATORY MEDICATION CHARGE

## 2025-02-12 ENCOUNTER — PHARMACY VISIT (OUTPATIENT)
Dept: PHARMACY | Facility: CLINIC | Age: 60
End: 2025-02-12
Payer: COMMERCIAL

## 2025-02-12 DIAGNOSIS — J20.9 ACUTE BRONCHITIS, UNSPECIFIED ORGANISM: ICD-10-CM

## 2025-02-12 DIAGNOSIS — J45.901 EXACERBATION OF ASTHMA, UNSPECIFIED ASTHMA SEVERITY, UNSPECIFIED WHETHER PERSISTENT (HHS-HCC): ICD-10-CM

## 2025-02-12 RX ORDER — IPRATROPIUM BROMIDE AND ALBUTEROL SULFATE 2.5; .5 MG/3ML; MG/3ML
3 SOLUTION RESPIRATORY (INHALATION)
Qty: 180 ML | Refills: 0 | Status: CANCELLED | OUTPATIENT
Start: 2025-02-10 | End: 2026-02-10

## 2025-02-14 DIAGNOSIS — J45.901 EXACERBATION OF ASTHMA, UNSPECIFIED ASTHMA SEVERITY, UNSPECIFIED WHETHER PERSISTENT (HHS-HCC): ICD-10-CM

## 2025-02-14 DIAGNOSIS — J20.9 ACUTE BRONCHITIS, UNSPECIFIED ORGANISM: ICD-10-CM

## 2025-02-14 RX ORDER — IPRATROPIUM BROMIDE AND ALBUTEROL SULFATE 2.5; .5 MG/3ML; MG/3ML
3 SOLUTION RESPIRATORY (INHALATION)
Qty: 180 ML | Refills: 0 | OUTPATIENT
Start: 2025-02-14 | End: 2026-02-14

## 2025-02-18 ENCOUNTER — PHARMACY VISIT (OUTPATIENT)
Dept: PHARMACY | Facility: CLINIC | Age: 60
End: 2025-02-18
Payer: COMMERCIAL

## 2025-02-18 PROCEDURE — RXMED WILLOW AMBULATORY MEDICATION CHARGE

## 2025-02-18 RX ORDER — LORAZEPAM 0.5 MG/1
TABLET ORAL
Qty: 14 TABLET | Refills: 0 | OUTPATIENT
Start: 2025-02-18

## 2025-02-18 RX ORDER — FLUTICASONE FUROATE AND VILANTEROL 200; 25 UG/1; UG/1
1 POWDER RESPIRATORY (INHALATION) DAILY
Qty: 60 EACH | Refills: 11 | OUTPATIENT
Start: 2025-02-18

## 2025-02-19 ENCOUNTER — OFFICE VISIT (OUTPATIENT)
Dept: CARDIOLOGY | Facility: CLINIC | Age: 60
End: 2025-02-19
Payer: MEDICARE

## 2025-02-19 VITALS
HEIGHT: 71 IN | DIASTOLIC BLOOD PRESSURE: 80 MMHG | RESPIRATION RATE: 16 BRPM | SYSTOLIC BLOOD PRESSURE: 122 MMHG | OXYGEN SATURATION: 98 % | HEART RATE: 74 BPM | BODY MASS INDEX: 35.28 KG/M2 | WEIGHT: 252 LBS

## 2025-02-19 DIAGNOSIS — I48.0 PAF (PAROXYSMAL ATRIAL FIBRILLATION) (MULTI): Primary | ICD-10-CM

## 2025-02-19 PROCEDURE — 3008F BODY MASS INDEX DOCD: CPT | Performed by: INTERNAL MEDICINE

## 2025-02-19 PROCEDURE — 3074F SYST BP LT 130 MM HG: CPT | Performed by: INTERNAL MEDICINE

## 2025-02-19 PROCEDURE — 99214 OFFICE O/P EST MOD 30 MIN: CPT | Performed by: INTERNAL MEDICINE

## 2025-02-19 PROCEDURE — 3079F DIAST BP 80-89 MM HG: CPT | Performed by: INTERNAL MEDICINE

## 2025-02-19 ASSESSMENT — LIFESTYLE VARIABLES
AUDIT-C TOTAL SCORE: 1
HAS A RELATIVE, FRIEND, DOCTOR, OR ANOTHER HEALTH PROFESSIONAL EXPRESSED CONCERN ABOUT YOUR DRINKING OR SUGGESTED YOU CUT DOWN: NO
SKIP TO QUESTIONS 9-10: 1
AUDIT TOTAL SCORE: 1
HOW OFTEN DO YOU HAVE SIX OR MORE DRINKS ON ONE OCCASION: NEVER
HAVE YOU OR SOMEONE ELSE BEEN INJURED AS A RESULT OF YOUR DRINKING: NO
HOW OFTEN DO YOU HAVE A DRINK CONTAINING ALCOHOL: MONTHLY OR LESS
HOW MANY STANDARD DRINKS CONTAINING ALCOHOL DO YOU HAVE ON A TYPICAL DAY: 1 OR 2

## 2025-02-19 ASSESSMENT — ENCOUNTER SYMPTOMS
LOSS OF SENSATION IN FEET: 0
OCCASIONAL FEELINGS OF UNSTEADINESS: 0
DEPRESSION: 0

## 2025-02-19 ASSESSMENT — PATIENT HEALTH QUESTIONNAIRE - PHQ9
1. LITTLE INTEREST OR PLEASURE IN DOING THINGS: NOT AT ALL
SUM OF ALL RESPONSES TO PHQ9 QUESTIONS 1 AND 2: 0
2. FEELING DOWN, DEPRESSED OR HOPELESS: NOT AT ALL

## 2025-02-19 ASSESSMENT — PAIN SCALES - GENERAL: PAINLEVEL_OUTOF10: 0-NO PAIN

## 2025-02-19 NOTE — ASSESSMENT & PLAN NOTE
Treated with amlodipine which has helped reduce her chest pain.  Her coronary calcium score was 0 with an extremely low likelihood of adverse cardiovascular ischemic events.

## 2025-02-19 NOTE — ASSESSMENT & PLAN NOTE
Remains on flecainide 50 mg twice daily and daily metoprolol succinate.  I suggested she would be a candidate for pill in the pocket strategy using flecainide at only on an as-needed basis at a dose of 50 mg 2 tablets for recurrence, and then 50 mg twice daily until she converted back sinus rhythm.    She remains on Eliquis.  Chronic anticoagulation.

## 2025-02-19 NOTE — PROGRESS NOTES
Subjective      No chief complaint on file.       Here for follow-up visit, remains angina free    Previous:  She was in the emergency room in June 2024 with atypical chest pain syndrome worse if she took a deep breath but not related to physical activity, and nonischemic EKG during that visit.  Her chest x-ray showed no acute cardiopulmonary processes.  She has been well since that visit without angina.  She did have symptoms suggestive of possible variant angina so I placed her on amlodipine which seemed to reduce her chest pain episodes and maintain for treatment of variant angina and hypertension.     She had previous coronary calcium scoring June 26, 2023 with a 0 score meaning 0% 10-year risk of coronary ischemia.     She had a previous history of paroxysmal atrial fibrillation that has not been recurrent in the presence of metoprolol, flecainide, and she remains on Eliquis anticoagulation.           Review of Systems   All other systems reviewed and are negative.       Objective   Physical Exam  Constitutional:       Appearance: Normal appearance.   HENT:      Head: Normocephalic and atraumatic.   Eyes:      Pupils: Pupils are equal, round, and reactive to light.   Cardiovascular:      Rate and Rhythm: Normal rate and regular rhythm.      Pulses: Normal pulses.      Heart sounds: Normal heart sounds.   Pulmonary:      Effort: Pulmonary effort is normal.      Breath sounds: Normal breath sounds.   Abdominal:      General: Abdomen is flat. Bowel sounds are normal.      Palpations: Abdomen is soft.   Musculoskeletal:         General: Normal range of motion.      Cervical back: Normal range of motion.   Skin:     General: Skin is warm and dry.   Neurological:      General: No focal deficit present.   Psychiatric:         Mood and Affect: Mood normal.         Judgment: Judgment normal.          Lab Review:   Not applicable    Variant angina (CMS-HCC)  Treated with amlodipine which has helped reduce her chest pain.   Her coronary calcium score was 0 with an extremely low likelihood of adverse cardiovascular ischemic events.    Jossue (Multi)  Remains on flecainide 50 mg twice daily and daily metoprolol succinate.  I suggested she would be a candidate for pill in the pocket strategy using flecainide at only on an as-needed basis at a dose of 50 mg 2 tablets for recurrence, and then 50 mg twice daily until she converted back sinus rhythm.    She remains on Eliquis.  Chronic anticoagulation.

## 2025-02-24 DIAGNOSIS — J20.9 ACUTE BRONCHITIS, UNSPECIFIED ORGANISM: ICD-10-CM

## 2025-02-24 DIAGNOSIS — J45.901 EXACERBATION OF ASTHMA, UNSPECIFIED ASTHMA SEVERITY, UNSPECIFIED WHETHER PERSISTENT (HHS-HCC): ICD-10-CM

## 2025-02-24 PROCEDURE — RXMED WILLOW AMBULATORY MEDICATION CHARGE

## 2025-02-26 PROCEDURE — RXMED WILLOW AMBULATORY MEDICATION CHARGE

## 2025-02-27 DIAGNOSIS — J20.9 ACUTE BRONCHITIS, UNSPECIFIED ORGANISM: ICD-10-CM

## 2025-02-27 DIAGNOSIS — J45.901 EXACERBATION OF ASTHMA, UNSPECIFIED ASTHMA SEVERITY, UNSPECIFIED WHETHER PERSISTENT (HHS-HCC): ICD-10-CM

## 2025-02-27 PROCEDURE — RXMED WILLOW AMBULATORY MEDICATION CHARGE

## 2025-02-27 RX ORDER — IPRATROPIUM BROMIDE AND ALBUTEROL SULFATE 2.5; .5 MG/3ML; MG/3ML
3 SOLUTION RESPIRATORY (INHALATION)
Qty: 180 ML | Refills: 0 | OUTPATIENT
Start: 2025-02-27 | End: 2026-02-27

## 2025-02-28 ENCOUNTER — PHARMACY VISIT (OUTPATIENT)
Dept: PHARMACY | Facility: CLINIC | Age: 60
End: 2025-02-28
Payer: COMMERCIAL

## 2025-03-05 ENCOUNTER — APPOINTMENT (OUTPATIENT)
Dept: RADIOLOGY | Facility: HOSPITAL | Age: 60
End: 2025-03-05
Payer: MEDICARE

## 2025-03-05 ENCOUNTER — APPOINTMENT (OUTPATIENT)
Dept: CARDIOLOGY | Facility: HOSPITAL | Age: 60
End: 2025-03-05
Payer: MEDICARE

## 2025-03-05 ENCOUNTER — HOSPITAL ENCOUNTER (EMERGENCY)
Facility: HOSPITAL | Age: 60
Discharge: HOME | End: 2025-03-05
Payer: MEDICARE

## 2025-03-05 VITALS
TEMPERATURE: 97.5 F | DIASTOLIC BLOOD PRESSURE: 84 MMHG | WEIGHT: 250 LBS | HEART RATE: 76 BPM | HEIGHT: 71 IN | SYSTOLIC BLOOD PRESSURE: 147 MMHG | BODY MASS INDEX: 35 KG/M2 | OXYGEN SATURATION: 100 % | RESPIRATION RATE: 18 BRPM

## 2025-03-05 DIAGNOSIS — R55 NEAR SYNCOPE: Primary | ICD-10-CM

## 2025-03-05 LAB
ALBUMIN SERPL BCP-MCNC: 4.3 G/DL (ref 3.4–5)
ALP SERPL-CCNC: 65 U/L (ref 33–110)
ALT SERPL W P-5'-P-CCNC: 13 U/L (ref 7–45)
ANION GAP SERPL CALCULATED.3IONS-SCNC: 12 MMOL/L (ref 10–20)
AST SERPL W P-5'-P-CCNC: 11 U/L (ref 9–39)
ATRIAL RATE: 64 BPM
BASOPHILS # BLD AUTO: 0.02 X10*3/UL (ref 0–0.1)
BASOPHILS NFR BLD AUTO: 0.3 %
BILIRUB SERPL-MCNC: 0.5 MG/DL (ref 0–1.2)
BUN SERPL-MCNC: 15 MG/DL (ref 6–23)
CALCIUM SERPL-MCNC: 9.1 MG/DL (ref 8.6–10.3)
CARDIAC TROPONIN I PNL SERPL HS: 6 NG/L (ref 0–13)
CHLORIDE SERPL-SCNC: 103 MMOL/L (ref 98–107)
CO2 SERPL-SCNC: 26 MMOL/L (ref 21–32)
CREAT SERPL-MCNC: 0.67 MG/DL (ref 0.5–1.05)
EGFRCR SERPLBLD CKD-EPI 2021: >90 ML/MIN/1.73M*2
EOSINOPHIL # BLD AUTO: 0.13 X10*3/UL (ref 0–0.7)
EOSINOPHIL NFR BLD AUTO: 1.8 %
ERYTHROCYTE [DISTWIDTH] IN BLOOD BY AUTOMATED COUNT: 15.2 % (ref 11.5–14.5)
GLUCOSE SERPL-MCNC: 88 MG/DL (ref 74–99)
HCT VFR BLD AUTO: 42.2 % (ref 36–46)
HGB BLD-MCNC: 14.1 G/DL (ref 12–16)
IMM GRANULOCYTES # BLD AUTO: 0.05 X10*3/UL (ref 0–0.7)
IMM GRANULOCYTES NFR BLD AUTO: 0.7 % (ref 0–0.9)
LYMPHOCYTES # BLD AUTO: 2.02 X10*3/UL (ref 1.2–4.8)
LYMPHOCYTES NFR BLD AUTO: 28.6 %
MAGNESIUM SERPL-MCNC: 1.77 MG/DL (ref 1.6–2.4)
MCH RBC QN AUTO: 29.5 PG (ref 26–34)
MCHC RBC AUTO-ENTMCNC: 33.4 G/DL (ref 32–36)
MCV RBC AUTO: 88 FL (ref 80–100)
MONOCYTES # BLD AUTO: 0.36 X10*3/UL (ref 0.1–1)
MONOCYTES NFR BLD AUTO: 5.1 %
NEUTROPHILS # BLD AUTO: 4.49 X10*3/UL (ref 1.2–7.7)
NEUTROPHILS NFR BLD AUTO: 63.5 %
NRBC BLD-RTO: 0 /100 WBCS (ref 0–0)
P AXIS: 68 DEGREES
P OFFSET: 193 MS
P ONSET: 127 MS
PLATELET # BLD AUTO: 225 X10*3/UL (ref 150–450)
POTASSIUM SERPL-SCNC: 3.5 MMOL/L (ref 3.5–5.3)
PR INTERVAL: 180 MS
PROT SERPL-MCNC: 7.4 G/DL (ref 6.4–8.2)
Q ONSET: 217 MS
QRS COUNT: 10 BEATS
QRS DURATION: 86 MS
QT INTERVAL: 404 MS
QTC CALCULATION(BAZETT): 416 MS
QTC FREDERICIA: 412 MS
R AXIS: 28 DEGREES
RBC # BLD AUTO: 4.78 X10*6/UL (ref 4–5.2)
SODIUM SERPL-SCNC: 137 MMOL/L (ref 136–145)
T AXIS: 32 DEGREES
T OFFSET: 419 MS
VENTRICULAR RATE: 64 BPM
WBC # BLD AUTO: 7.1 X10*3/UL (ref 4.4–11.3)

## 2025-03-05 PROCEDURE — 84075 ASSAY ALKALINE PHOSPHATASE: CPT

## 2025-03-05 PROCEDURE — 96361 HYDRATE IV INFUSION ADD-ON: CPT

## 2025-03-05 PROCEDURE — 96375 TX/PRO/DX INJ NEW DRUG ADDON: CPT

## 2025-03-05 PROCEDURE — 96374 THER/PROPH/DIAG INJ IV PUSH: CPT

## 2025-03-05 PROCEDURE — 70450 CT HEAD/BRAIN W/O DYE: CPT

## 2025-03-05 PROCEDURE — 70450 CT HEAD/BRAIN W/O DYE: CPT | Performed by: RADIOLOGY

## 2025-03-05 PROCEDURE — 99285 EMERGENCY DEPT VISIT HI MDM: CPT | Mod: 25

## 2025-03-05 PROCEDURE — 84484 ASSAY OF TROPONIN QUANT: CPT

## 2025-03-05 PROCEDURE — 85025 COMPLETE CBC W/AUTO DIFF WBC: CPT

## 2025-03-05 PROCEDURE — 72125 CT NECK SPINE W/O DYE: CPT | Performed by: RADIOLOGY

## 2025-03-05 PROCEDURE — 36415 COLL VENOUS BLD VENIPUNCTURE: CPT

## 2025-03-05 PROCEDURE — 72125 CT NECK SPINE W/O DYE: CPT

## 2025-03-05 PROCEDURE — 83735 ASSAY OF MAGNESIUM: CPT

## 2025-03-05 PROCEDURE — 71045 X-RAY EXAM CHEST 1 VIEW: CPT

## 2025-03-05 PROCEDURE — 2500000005 HC RX 250 GENERAL PHARMACY W/O HCPCS

## 2025-03-05 PROCEDURE — 93005 ELECTROCARDIOGRAM TRACING: CPT

## 2025-03-05 PROCEDURE — 2500000004 HC RX 250 GENERAL PHARMACY W/ HCPCS (ALT 636 FOR OP/ED)

## 2025-03-05 RX ORDER — KETOROLAC TROMETHAMINE 15 MG/ML
15 INJECTION, SOLUTION INTRAMUSCULAR; INTRAVENOUS ONCE
Status: COMPLETED | OUTPATIENT
Start: 2025-03-05 | End: 2025-03-05

## 2025-03-05 RX ORDER — LIDOCAINE 560 MG/1
1 PATCH PERCUTANEOUS; TOPICAL; TRANSDERMAL ONCE
Status: DISCONTINUED | OUTPATIENT
Start: 2025-03-05 | End: 2025-03-05 | Stop reason: HOSPADM

## 2025-03-05 RX ORDER — ONDANSETRON HYDROCHLORIDE 2 MG/ML
4 INJECTION, SOLUTION INTRAVENOUS ONCE
Status: COMPLETED | OUTPATIENT
Start: 2025-03-05 | End: 2025-03-05

## 2025-03-05 RX ADMIN — KETOROLAC TROMETHAMINE 15 MG: 15 INJECTION, SOLUTION INTRAMUSCULAR; INTRAVENOUS at 11:39

## 2025-03-05 RX ADMIN — ONDANSETRON HYDROCHLORIDE 4 MG: 2 INJECTION INTRAMUSCULAR; INTRAVENOUS at 11:40

## 2025-03-05 RX ADMIN — SODIUM CHLORIDE 1000 ML: 9 INJECTION, SOLUTION INTRAVENOUS at 11:39

## 2025-03-05 RX ADMIN — LIDOCAINE 4% 1 PATCH: 40 PATCH TOPICAL at 11:38

## 2025-03-05 ASSESSMENT — COLUMBIA-SUICIDE SEVERITY RATING SCALE - C-SSRS
1. IN THE PAST MONTH, HAVE YOU WISHED YOU WERE DEAD OR WISHED YOU COULD GO TO SLEEP AND NOT WAKE UP?: NO
2. HAVE YOU ACTUALLY HAD ANY THOUGHTS OF KILLING YOURSELF?: NO
6. HAVE YOU EVER DONE ANYTHING, STARTED TO DO ANYTHING, OR PREPARED TO DO ANYTHING TO END YOUR LIFE?: NO

## 2025-03-05 ASSESSMENT — PAIN SCALES - GENERAL: PAINLEVEL_OUTOF10: 5 - MODERATE PAIN

## 2025-03-05 NOTE — Clinical Note
Lili Doe was seen and treated in our emergency department on 3/5/2025.  She may return to work on 03/07/2025.       If you have any questions or concerns, please don't hesitate to call.      Christy Paniagua PA-C

## 2025-03-05 NOTE — ED PROVIDER NOTES
HPI   Chief Complaint   Patient presents with    Dizziness     Pt states that she felt like she was going to pass out with neck pain since this morning. States that now she has pain in the back of her neck       Patient is a 59-year-old female presenting to the emergency department for evaluation of near syncope.  Patient states she was at work when she suddenly felt like she was going to pass out.  She states she sat down however continued to have a feeling and therefore decided to drive herself to the emergency department.  She states this is never happened to her before.  She does admit to a history of A-fib in which she is on metoprolol and Eliquis.  She denies any chest pain, shortness of breath, fevers, chills.  She does admit to nausea but no vomiting.  She denies any abdominal pain, cough, congestion, headaches, numbness, tingling, hematuria, dysuria, constipation, diarrhea.  She states that she did have 3 cups of coffee this morning and did not eat.  She states now she is experiencing some pain in her neck.  She describes it as a aching pain.  Nothing makes it better or worse.  She denies any trauma or injury to the neck.              Patient History   Past Medical History:   Diagnosis Date    Asthma     Atrial fibrillation (Multi)     Hypertension     Neck pain     Right knee pain      Past Surgical History:   Procedure Laterality Date    CHOLECYSTECTOMY  1987     Family History   Problem Relation Name Age of Onset    Stroke Mother      Cataracts Mother      No Known Problems Father       Social History     Tobacco Use    Smoking status: Every Day     Current packs/day: 1.00     Average packs/day: 1 pack/day for 38.2 years (38.2 ttl pk-yrs)     Types: Cigarettes     Start date: 1987     Passive exposure: Current    Smokeless tobacco: Never   Vaping Use    Vaping status: Never Used   Substance Use Topics    Alcohol use: Yes     Comment: occasional    Drug use: Yes     Types: Marijuana       Physical Exam    ED Triage Vitals [03/05/25 1039]   Temperature Heart Rate Respirations BP   36.4 °C (97.5 °F) 76 18 147/84      Pulse Ox Temp Source Heart Rate Source Patient Position   100 % Temporal Monitor Sitting      BP Location FiO2 (%)     Right arm --       Physical Exam  Vitals and nursing note reviewed.   Constitutional:       General: She is not in acute distress.     Appearance: Normal appearance. She is not ill-appearing or toxic-appearing.   HENT:      Head: Normocephalic and atraumatic.      Nose: Nose normal.      Mouth/Throat:      Mouth: Mucous membranes are moist.   Eyes:      Extraocular Movements: Extraocular movements intact.      Pupils: Pupils are equal, round, and reactive to light.   Cardiovascular:      Rate and Rhythm: Normal rate and regular rhythm.   Pulmonary:      Effort: Pulmonary effort is normal.      Breath sounds: Normal breath sounds.   Abdominal:      Palpations: Abdomen is soft.      Tenderness: There is no abdominal tenderness.   Musculoskeletal:         General: Normal range of motion.      Cervical back: Normal range of motion. Tenderness (Cervical paraspinals with no midline tenderness, step-offs, deformities) present.   Skin:     General: Skin is warm and dry.   Neurological:      General: No focal deficit present.      Mental Status: She is alert and oriented to person, place, and time.      Sensory: No sensory deficit.      Motor: No weakness.      Gait: Gait normal.   Psychiatric:         Mood and Affect: Mood normal.         Behavior: Behavior normal.           ED Course & MDM   ED Course as of 03/05/25 1352   Wed Mar 05, 2025   1125 EKG on my independent interpretation: Normal sinus rhythm 64 bpm, normal axis, normal intervals, no acute ST or T wave abnormalities [SATISH]      ED Course User Index  [SATISH] Lyndsey Shah MD         Diagnoses as of 03/05/25 1352   Near syncope                 No data recorded     [place] Coma Scale Score: 15 (03/05/25 1041 : Adriana Bocanegra RN)        NIH Stroke Scale: 0 (03/05/25 1140 : Christy Paniagua PA-C)                   Medical Decision Making  **Disclaimer parts of this chart have been completed using voice recognition software. Please excuse any errors of transcription.     Evaluated this patient independently and my supervising physician was available for consultation.    HPI: Detailed above.    Exam: A medically appropriate exam performed, outlined above, given the known history and presentation.    History obtained from: Patient    EKG: Reviewed by myself.  Reviewed and interpreted by my attending physician.    Labs/Diagnostics:  Labs Reviewed   CBC WITH AUTO DIFFERENTIAL - Abnormal       Result Value    WBC 7.1      nRBC 0.0      RBC 4.78      Hemoglobin 14.1      Hematocrit 42.2      MCV 88      MCH 29.5      MCHC 33.4      RDW 15.2 (*)     Platelets 225      Neutrophils % 63.5      Immature Granulocytes %, Automated 0.7      Lymphocytes % 28.6      Monocytes % 5.1      Eosinophils % 1.8      Basophils % 0.3      Neutrophils Absolute 4.49      Immature Granulocytes Absolute, Automated 0.05      Lymphocytes Absolute 2.02      Monocytes Absolute 0.36      Eosinophils Absolute 0.13      Basophils Absolute 0.02     COMPREHENSIVE METABOLIC PANEL - Normal    Glucose 88      Sodium 137      Potassium 3.5      Chloride 103      Bicarbonate 26      Anion Gap 12      Urea Nitrogen 15      Creatinine 0.67      eGFR >90      Calcium 9.1      Albumin 4.3      Alkaline Phosphatase 65      Total Protein 7.4      AST 11      Bilirubin, Total 0.5      ALT 13     MAGNESIUM - Normal    Magnesium 1.77     TROPONIN I, HIGH SENSITIVITY - Normal    Troponin I, High Sensitivity 6      Narrative:     Less than 99th percentile of normal range cutoff-  Female and children under 18 years old <14 ng/L; Male <21 ng/L: Negative  Repeat testing should be performed if clinically indicated.     Female and children under 18 years old 14-50 ng/L; Male 21-50 ng/L:  Consistent with  "possible cardiac damage and possible increased clinical   risk. Serial measurements may help to assess extent of myocardial damage.     >50 ng/L: Consistent with cardiac damage, increased clinical risk and  myocardial infarction. Serial measurements may help assess extent of   myocardial damage.      NOTE: Children less than 1 year old may have higher baseline troponin   levels and results should be interpreted in conjunction with the overall   clinical context.     NOTE: Troponin I testing is performed using a different   testing methodology at [facility] than at other   [facility]. Direct result comparisons should only   be made within the same method.     XR chest 1 view   Final Result   No evidence of acute cardiopulmonary process.        Signed by: Alexsandra Ward 3/5/2025 12:41 PM   Dictation workstation:   [id number]      CT head wo IV contrast   Final Result   1. No CT evidence of acute intracranial abnormality. Consider   follow-up with MRI as warranted.        2. Stable \"empty sella\" appearance and stable 1.9 cm left sphenoid   sinus mucous retention cyst versus polyp compared with the 2022 exam.             Signed by: Nataliia Ham 3/5/2025 12:17 PM   Dictation workstation:   [id number]      CT cervical spine wo IV contrast   Final Result   1. No acute bony abnormality of the cervical spine.        2. Stable multilevel degenerative disc disease and bilateral facet   joint arthrosis with varying degrees of bilateral neural foraminal   narrowing throughout the cervical spine, as detailed above.        Signed by: Nataliia Ham 3/5/2025 12:26 PM   Dictation workstation:   [id number]        EMERGENCY DEPARTMENT COURSE and DIFFERENTIAL DIAGNOSIS/MDM:  Patient is a 59-year-old female presenting to the emergency department for evaluation of near syncope and neck pain.  On physical exam vital signs remarkable for hypertension but otherwise stable and patient is in no acute distress.  Patient " has a mild tenderness to palpation in the thoracic paraspinals with no midline tenderness, step-offs, deformities.  She has full range of motion of the neck.  No meningeal signs.  NIH 0.  Diagnostic labs ordered as well as CT of the head, cervical spine, and chest x-ray.  Patient also given IV Toradol, IV Zofran, IV fluids, and lidocaine patch for pain.  Initial troponin 6 and patient not complaining of any chest pain therefore do not feel repeat is necessary.  CMP showed no electrolyte normalities.  Magnesium normal.  CBC showed no leukocytosis or anemia.  Chest x-ray showed no evidence of acute cardiopulmonary process with no pneumonia, pneumothorax, cardiomegaly.  CT of the head showed no evidence of acute intracranial abnormality.  CT of the cervical spine showed no acute bony abnormalities of the cervical spine.  After fluids patient was feeling a little bit better.  At this time suspect this could be near syncopal episode due to patient not eating prior to having 3 cups of coffee.  Patient was discharged in stable condition.  She was advised to follow-up with primary care physician outpatient within the next 1 to 2 days.  She will return to the emergency department with any new or worsening symptoms.  Return precautions discussed.    The patient presented with a chief complaint of near syncope. The differential diagnosis associated with this patient's presentation includes ACS, CVA/TIA, electrolyte abnormalities, dehydration, viral illness, syncope.     Emergent pathologies were considered for this patient, although I have low suspicion for anything acutely emergent given patient's clinical presentation, history, physical exam, stable vital signs, and relatively unremarkable workup.  Discharging patient home is reasonable plan of care for outpatient management.     All labs, imaging, and diagnostic studies were reviewed by me and patient was counseled on clinical impression, expectations, and plan.  Patient  "was educated to follow-up with PCP in the following 1-2 days.  All questions from patient were answered. They elicited understanding and were agreeable to course of treatment.  Patient was discharged in stable condition and given strict return precautions.      Vitals:    Vitals:    03/05/25 1039   BP: 147/84   BP Location: Right arm   Patient Position: Sitting   Pulse: 76   Resp: 18   Temp: 36.4 °C (97.5 °F)   TempSrc: Temporal   SpO2: 100%   Weight: 113 kg (250 lb)   Height: 1.803 m (5' 11\")       History Limited by:    None    Independent history obtained from:    None    External records reviewed:    Inpatient Notes/Discharge Summary from previous emergency department visit on 1/21/2025 where patient was seen for shortness of breath    Diagnostics interpreted by me:    CT Scan(s) see MDM and Xrays - see my independent interpretation in MDM    Discussions with other clinicians:    None    Chronic conditions impacting care:    None    Social determinants of health affecting care:    None    Diagnostic tests considered but not performed: None    ED Medications managed:    Medications   lidocaine 4 % patch 1 patch (1 patch transdermal Medication Applied 3/5/25 1138)   sodium chloride 0.9 % bolus 1,000 mL (1,000 mL intravenous New Bag 3/5/25 1139)   ketorolac (Toradol) injection 15 mg (15 mg intravenous Given 3/5/25 1139)   ondansetron (Zofran) injection 4 mg (4 mg intravenous Given 3/5/25 1140)       Prescription drugs considered:    None    Screenings:  NIH Stroke Scale: 0             Procedure  Procedures     Christy Paniagua PA-C  03/05/25 1358    "

## 2025-03-05 NOTE — DISCHARGE INSTRUCTIONS
Thank you for visiting [facility] emergency department.  It was a pleasure caring for you.    Be sure to take all medications, over the counter medications or prescription medications only as directed.     Be sure to follow up as directed in 1-2 days.  All of the details of your follow up instructions are detailed in the follow up section of this packet.    If you are being discharged with any pains medications or muscle relaxers (norco, Vicodin, hydrocodone products, Percocet, oxycodone products, flexeril, cyclobenzaprine, robaxin, norflex, brand or generic, or any other pain controlling medications with the exception of Ibuprofen and regular Tylenol, do not drive or operate machinery, climb ladders or participate in any activity that could potentially put yourself or others at risk should you get dizzy, or be/feel impaired at all.        It is important to remember that your care does not end here and you must continue to monitor your condition closely. Please return to the emergency department for any worsening or concerning signs or symptoms as directed by our conversations and the discharge instructions. Otherwise please follow up with your doctor in 2 days if no better or worse. If you do not have a doctor please contact the referral number on your discharge instructions. Please contact any physician specialists provided in your discharge notes as it is very important to follow up with them regarding your condition. If you are unable to reach the physicians provided, please come back to the Emergency Department at any time.     As always, please take medications as directed. If you have any questions at all regarding your medications, please contact the pharmacist, the emergency department, or your doctor. Before taking any medication prescribed in the Emergency Department, please review the medication side effects and drug interactions (<http://www.rxlist.com/script/main/hp.asp>) as they may interact with your  home medications.     Having trouble affording medications? Try [employer] <http://MusicX[employer]/>! (This is not a hospital endorsed website, merely a recommendation based on my own personal experiences with [employer])      Return to emergency room without delay for ANY new or worsening pains or for any other symptoms or concerns.      Return with worsening pains, nausea, vomiting, trouble breathing, palpitations, shortness of breath, inability to pass stool or urine, loss of control of stool or urine, any numbness or tingling (that is not normal for you), uncontrolled fevers, the passing of blood or other material in stool or urine, rashes, pains or for any other symptoms or concerns you may have.  You are always welcome to return to the ER at any time for any reason or for any other concerns you may have.

## 2025-03-07 DIAGNOSIS — J45.901 EXACERBATION OF ASTHMA, UNSPECIFIED ASTHMA SEVERITY, UNSPECIFIED WHETHER PERSISTENT (HHS-HCC): ICD-10-CM

## 2025-03-07 DIAGNOSIS — J20.9 ACUTE BRONCHITIS, UNSPECIFIED ORGANISM: ICD-10-CM

## 2025-03-07 PROCEDURE — RXMED WILLOW AMBULATORY MEDICATION CHARGE

## 2025-03-07 RX ORDER — IPRATROPIUM BROMIDE AND ALBUTEROL SULFATE 2.5; .5 MG/3ML; MG/3ML
3 SOLUTION RESPIRATORY (INHALATION)
Qty: 180 ML | Refills: 0 | Status: CANCELLED | OUTPATIENT
Start: 2025-03-07 | End: 2026-03-07

## 2025-03-10 DIAGNOSIS — J45.901 EXACERBATION OF ASTHMA, UNSPECIFIED ASTHMA SEVERITY, UNSPECIFIED WHETHER PERSISTENT (HHS-HCC): ICD-10-CM

## 2025-03-10 DIAGNOSIS — J20.9 ACUTE BRONCHITIS, UNSPECIFIED ORGANISM: ICD-10-CM

## 2025-03-10 PROCEDURE — RXMED WILLOW AMBULATORY MEDICATION CHARGE

## 2025-03-10 RX ORDER — IPRATROPIUM BROMIDE AND ALBUTEROL SULFATE 2.5; .5 MG/3ML; MG/3ML
3 SOLUTION RESPIRATORY (INHALATION)
Qty: 180 ML | Refills: 0 | Status: CANCELLED | OUTPATIENT
Start: 2025-03-07 | End: 2026-03-07

## 2025-03-12 DIAGNOSIS — J20.9 ACUTE BRONCHITIS, UNSPECIFIED ORGANISM: ICD-10-CM

## 2025-03-12 DIAGNOSIS — J45.901 EXACERBATION OF ASTHMA, UNSPECIFIED ASTHMA SEVERITY, UNSPECIFIED WHETHER PERSISTENT (HHS-HCC): ICD-10-CM

## 2025-03-12 PROCEDURE — RXMED WILLOW AMBULATORY MEDICATION CHARGE

## 2025-03-12 RX ORDER — IPRATROPIUM BROMIDE AND ALBUTEROL SULFATE 2.5; .5 MG/3ML; MG/3ML
3 SOLUTION RESPIRATORY (INHALATION)
Qty: 180 ML | Refills: 0 | Status: CANCELLED | OUTPATIENT
Start: 2025-03-07 | End: 2026-03-07

## 2025-03-13 DIAGNOSIS — J20.9 ACUTE BRONCHITIS, UNSPECIFIED ORGANISM: ICD-10-CM

## 2025-03-13 DIAGNOSIS — J45.901 EXACERBATION OF ASTHMA, UNSPECIFIED ASTHMA SEVERITY, UNSPECIFIED WHETHER PERSISTENT (HHS-HCC): ICD-10-CM

## 2025-03-13 PROCEDURE — RXMED WILLOW AMBULATORY MEDICATION CHARGE

## 2025-03-13 RX ORDER — IPRATROPIUM BROMIDE AND ALBUTEROL SULFATE 2.5; .5 MG/3ML; MG/3ML
3 SOLUTION RESPIRATORY (INHALATION)
Qty: 180 ML | Refills: 0 | Status: CANCELLED | OUTPATIENT
Start: 2025-03-07 | End: 2026-03-07

## 2025-03-17 DIAGNOSIS — J20.9 ACUTE BRONCHITIS, UNSPECIFIED ORGANISM: ICD-10-CM

## 2025-03-17 DIAGNOSIS — J45.901 EXACERBATION OF ASTHMA, UNSPECIFIED ASTHMA SEVERITY, UNSPECIFIED WHETHER PERSISTENT (HHS-HCC): ICD-10-CM

## 2025-03-17 RX ORDER — IPRATROPIUM BROMIDE AND ALBUTEROL SULFATE 2.5; .5 MG/3ML; MG/3ML
3 SOLUTION RESPIRATORY (INHALATION)
Qty: 180 ML | Refills: 0 | Status: CANCELLED | OUTPATIENT
Start: 2025-03-07 | End: 2026-03-07

## 2025-03-19 ENCOUNTER — HOSPITAL ENCOUNTER (OUTPATIENT)
Dept: RADIOLOGY | Facility: CLINIC | Age: 60
Discharge: HOME | End: 2025-03-19
Payer: MEDICARE

## 2025-03-19 ENCOUNTER — PHARMACY VISIT (OUTPATIENT)
Dept: PHARMACY | Facility: CLINIC | Age: 60
End: 2025-03-19
Payer: COMMERCIAL

## 2025-03-19 ENCOUNTER — OFFICE VISIT (OUTPATIENT)
Dept: ORTHOPEDIC SURGERY | Facility: CLINIC | Age: 60
End: 2025-03-19
Payer: MEDICARE

## 2025-03-19 VITALS — BODY MASS INDEX: 35 KG/M2 | WEIGHT: 250 LBS | HEIGHT: 71 IN

## 2025-03-19 DIAGNOSIS — M79.641 RIGHT HAND PAIN: Primary | ICD-10-CM

## 2025-03-19 DIAGNOSIS — S63.91XA HAND SPRAIN, RIGHT, INITIAL ENCOUNTER: ICD-10-CM

## 2025-03-19 DIAGNOSIS — M79.641 RIGHT HAND PAIN: ICD-10-CM

## 2025-03-19 DIAGNOSIS — M19.041 PRIMARY OSTEOARTHRITIS OF RIGHT HAND: ICD-10-CM

## 2025-03-19 PROCEDURE — RXMED WILLOW AMBULATORY MEDICATION CHARGE

## 2025-03-19 PROCEDURE — 99213 OFFICE O/P EST LOW 20 MIN: CPT | Performed by: PHYSICIAN ASSISTANT

## 2025-03-19 PROCEDURE — 99203 OFFICE O/P NEW LOW 30 MIN: CPT | Performed by: PHYSICIAN ASSISTANT

## 2025-03-19 PROCEDURE — 73130 X-RAY EXAM OF HAND: CPT | Mod: RT

## 2025-03-19 PROCEDURE — 3008F BODY MASS INDEX DOCD: CPT | Performed by: PHYSICIAN ASSISTANT

## 2025-03-19 RX ORDER — IPRATROPIUM BROMIDE AND ALBUTEROL SULFATE 2.5; .5 MG/3ML; MG/3ML
SOLUTION RESPIRATORY (INHALATION)
Qty: 360 ML | Refills: 11 | OUTPATIENT
Start: 2025-03-19

## 2025-03-19 RX ORDER — APIXABAN 5 MG/1
5 TABLET, FILM COATED ORAL 2 TIMES DAILY
Qty: 180 TABLET | Refills: 1 | OUTPATIENT
Start: 2025-03-19

## 2025-03-19 ASSESSMENT — ENCOUNTER SYMPTOMS
DEPRESSION: 0
OCCASIONAL FEELINGS OF UNSTEADINESS: 0
LOSS OF SENSATION IN FEET: 0

## 2025-03-19 ASSESSMENT — LIFESTYLE VARIABLES
HOW OFTEN DO YOU HAVE SIX OR MORE DRINKS ON ONE OCCASION: NEVER
AUDIT-C TOTAL SCORE: 2
HAS A RELATIVE, FRIEND, DOCTOR, OR ANOTHER HEALTH PROFESSIONAL EXPRESSED CONCERN ABOUT YOUR DRINKING OR SUGGESTED YOU CUT DOWN: NO
HOW OFTEN DO YOU HAVE A DRINK CONTAINING ALCOHOL: 2-4 TIMES A MONTH
HOW MANY STANDARD DRINKS CONTAINING ALCOHOL DO YOU HAVE ON A TYPICAL DAY: 1 OR 2
HOW OFTEN DURING THE LAST YEAR HAVE YOU FAILED TO DO WHAT WAS NORMALLY EXPECTED FROM YOU BECAUSE OF DRINKING: NEVER
AUDIT TOTAL SCORE: 2
HOW OFTEN DURING THE LAST YEAR HAVE YOU HAD A FEELING OF GUILT OR REMORSE AFTER DRINKING: NEVER
HOW OFTEN DURING THE LAST YEAR HAVE YOU BEEN UNABLE TO REMEMBER WHAT HAPPENED THE NIGHT BEFORE BECAUSE YOU HAD BEEN DRINKING: NEVER
HOW OFTEN DURING THE LAST YEAR HAVE YOU FOUND THAT YOU WERE NOT ABLE TO STOP DRINKING ONCE YOU HAD STARTED: NEVER
HAVE YOU OR SOMEONE ELSE BEEN INJURED AS A RESULT OF YOUR DRINKING: NO
HOW OFTEN DURING THE LAST YEAR HAVE YOU NEEDED AN ALCOHOLIC DRINK FIRST THING IN THE MORNING TO GET YOURSELF GOING AFTER A NIGHT OF HEAVY DRINKING: NEVER
SKIP TO QUESTIONS 9-10: 1

## 2025-03-19 ASSESSMENT — PAIN SCALES - GENERAL
PAINLEVEL_OUTOF10: 8
PAINLEVEL_OUTOF10: 9

## 2025-03-19 ASSESSMENT — PAIN DESCRIPTION - DESCRIPTORS: DESCRIPTORS: ACHING

## 2025-03-19 ASSESSMENT — PAIN - FUNCTIONAL ASSESSMENT: PAIN_FUNCTIONAL_ASSESSMENT: 0-10

## 2025-03-19 ASSESSMENT — PATIENT HEALTH QUESTIONNAIRE - PHQ9
1. LITTLE INTEREST OR PLEASURE IN DOING THINGS: NOT AT ALL
2. FEELING DOWN, DEPRESSED OR HOPELESS: NOT AT ALL
SUM OF ALL RESPONSES TO PHQ9 QUESTIONS 1 AND 2: 0

## 2025-03-19 NOTE — PATIENT INSTRUCTIONS
Thank you for coming to see us today!     We are going to give you a referral for hand therapy  Please call central scheduling to make this appointment.     Please follow up with us in 6 weeks

## 2025-03-19 NOTE — PROGRESS NOTES
Subjective      Chief Complaint   Patient presents with    Right Hand - Pain        No surgery found     HPI  This 59 year old established patient presents for evaluation and treatment of right hand pain. She states that her right hand began to hurt approximately 1 week ago. She denies any recent injury or trauma to her right hand. Patient does complain of swelling and pain with making a fist. She rates right hand pain at 9/10 and states it is deep in the volar aspect of her right palm. She does get intermittent numbness in her right hand when she wakes up in the morning. She is right hand dominant. She has tried and failed OTC tylenol and NSAIDS as well as meloxicam. She has also tried prednisone with some relief of the right hand pain.  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  She currently has been written off of work until 324 however she doubts she can return to her normal duties for the rest of the school year.    CARDIOLOGY:   Negative for chest pain, shortness of breath.   RESPIRATORY:   Negative for chest pain, shortness of breath.   MUSCULOSKELETAL:   See HPI for details.   NEUROLOGY:   Negative for tingling, numbness, weakness.    Objective    There were no vitals filed for this visit.    Physical Exam  GENERAL:          General Appearance:  This is a pleasant patient with appropriate affect, in no acute distress.   DERMATOLOGY:          Skin: skin at the neck, upper and lower back, and trunk is intact. There is no evidence of skin rash, skin breakdown or ulceration, or atrophic skin change.   EXTREMITIES:          Vascular:  Right, left hands and feet are warm with good color and pulses. Right and left calf and thigh are nontender and nonswollen.   NEUROLOGICAL:          Orientation:  Patient is alert and oriented to person, place, time and situation. Right and left upper and lower  extremity motor and sensory examinations are intact.      MUSCULOSKELETAL: Neck: Nontender. No pain with range of motion.  Left hand: Nontender no pain limitation with range of motion.  Right hand: There is mild pain with palpation over the volar aspect of the right hand.  There is no pain dorsally.  There is no evidence of trigger finger or catching locking of any of the right hand fingers.  Phalen's is negative.  Finkelstein's is negative.  There is no tenderness or pain with range of motion at the right wrist.  Compartments are soft.  No evidence of skin infection, abscess, gout or infectious process on examination today.  Brisk capillary refill.  Radial pulses readily palpable    AP and lateral x-rays of the right hand done in the office today show osteoarthritis at the metacarpal bones of the right hand.    XR chest 1 view    Result Date: 3/5/2025  Interpreted By:  Alexsandra Ward, STUDY: XR CHEST 1 VIEW;  3/5/2025 12:37 pm   INDICATION: Signs/Symptoms:dizziness.   COMPARISON: 01/21/2025   ACCESSION NUMBER(S): [id number]   ORDERING CLINICIAN: CROW REYES   FINDINGS: AP radiograph of the chest was provided.       CARDIOMEDIASTINAL SILHOUETTE: Cardiomediastinal silhouette is stable in size and configuration.   LUNGS: No consolidation, pulmonary edema, pleural effusion, or pneumothorax.   ABDOMEN: No remarkable upper abdominal findings.   BONES: No acute osseous changes.       No evidence of acute cardiopulmonary process.   Signed by: Alexsandra Ward 3/5/2025 12:41 PM Dictation workstation:   [id number]    CT cervical spine wo IV contrast    Result Date: 3/5/2025  Interpreted By:  Nataliia Ham, STUDY: CT CERVICAL SPINE WO IV CONTRAST;  3/5/2025 12:03 pm   INDICATION: Signs/Symptoms:neck pain.   COMPARISON: Cervical spine CT 03/26/2024.   ACCESSION NUMBER(S): [id number]   ORDERING CLINICIAN: CROW REYES   TECHNIQUE: Axial CT images of the cervical spine are obtained. Axial, coronal and sagittal  reconstructions are provided for review.   FINDINGS: No acute fracture or malalignment. Vertebral body heights are maintained. Atlantoaxial and atlantooccipital joint alignment is preserved. Facets are well aligned.   Multilevel degenerative changes are again seen and stable, including mild intervertebral disc space narrowing, anterior endplate osteophyte formation and uncovertebral joint hypertrophy from C2 through T1. No significant spinal canal narrowing. Diffuse bilateral facet joint hypertrophy is again seen and stable with mild right C3-C4, moderate bilateral C4-C5 and mild bilateral C5-C6 neural foraminal narrowing.   No prevertebral hematoma.       1. No acute bony abnormality of the cervical spine.   2. Stable multilevel degenerative disc disease and bilateral facet joint arthrosis with varying degrees of bilateral neural foraminal narrowing throughout the cervical spine, as detailed above.   Signed by: Nataliia Ham 3/5/2025 12:26 PM Dictation workstation:   [id number]    ECG 12 lead    Result Date: 3/5/2025  Normal sinus rhythm Normal ECG When compared with ECG of 21-JAN-2025 15:49, Criteria for Septal infarct are no longer Present Confirmed by Ricco Cortez ([id number]) on 3/5/2025 12:26:00 PM    CT head wo IV contrast    Result Date: 3/5/2025  Interpreted By:  Nataliia Ham, STUDY: CT HEAD WO IV CONTRAST;  3/5/2025 12:03 pm   INDICATION: Signs/Symptoms:lightheaded.   COMPARISON: CT head 08/31/2022.   ACCESSION NUMBER(S): [id number]   ORDERING CLINICIAN: CROW REYES   TECHNIQUE: Noncontrast axial CT scan of head was performed. Multiplanar reconstructions   FINDINGS: No evidence of a large territorial infarct. Gray-white matter interfaces are preserved. No acute intracranial hemorrhage, mass effect, midline shift, or herniation. No evidence of hydrocephalus. The ventricles and sulci are unremarkable for age.   The pituitary appears thinned/flattened, measuring about 2.4 mm in thickness and the sella is  "filled with CSF, resulting in an \"empty sella\" appearance. This is similar compared with 2022.   Stable size and appearance of the 1.9 x 1.3 cm left sphenoid sinus mucous retention cyst versus polyp in the lateral aspect of the left sphenoid sinus. The remainder of the paranasal sinuses and mastoid air cells are clear. No acute osseous abnormality of the calvarium. No destructive bone lesion.       1. No CT evidence of acute intracranial abnormality. Consider follow-up with MRI as warranted.   2. Stable \"empty sella\" appearance and stable 1.9 cm left sphenoid sinus mucous retention cyst versus polyp compared with the 2022 exam.     Signed by: Nataliia Ham 3/5/2025 12:17 PM Dictation workstation:   [id number]       Lili was seen today for pain.  Diagnoses and all orders for this visit:  Right hand pain (Primary)  -     XR hand right 3+ views; Future  -     Referral to Occupational Therapy; Future  Hand sprain, right, initial encounter  -     Referral to Occupational Therapy; Future  Primary osteoarthritis of right hand  -     Referral to Occupational Therapy; Future     Options are discussed with the patient in detail. The patient is given a prescription for occupational therapy to evaluate and treat with gentle strengthening and ROM exercises with modalities as needed. The patient is instructed regarding activity modification and risk for further injury with falling or trauma, ice, provider directed at home gentle strengthening and ROM exercises, and the appropriate use of Tylenol as needed for pain with its potential adverse reactions and side effects. The patient understands.  I estimate that this patient is not able to return to work before May 30, 2025.  Follow up in 6 weeks or sooner as needed. Please note that this report has been produced using speech recognition software.  It may contain errors related to grammar, punctuation or spelling.  Electronically signed, but not reviewed.     Cora Fonseca, " GRACIELA

## 2025-03-19 NOTE — LETTER
March 19, 2025     Patient: Lili Doe   YOB: 1965   Date of Visit: 3/19/2025       To Whom It May Concern:    It is my medical opinion that Lili Doe should remain out of work until May 30th 2025 .    If you have any questions or concerns, please don't hesitate to call.         Sincerely,        Cora Fonseca PA-C    CC: No Recipients

## 2025-03-24 PROCEDURE — RXMED WILLOW AMBULATORY MEDICATION CHARGE

## 2025-04-01 ENCOUNTER — TELEPHONE (OUTPATIENT)
Facility: CLINIC | Age: 60
End: 2025-04-01

## 2025-04-01 ENCOUNTER — TELEPHONE (OUTPATIENT)
Dept: CARDIOLOGY | Facility: CLINIC | Age: 60
End: 2025-04-01
Payer: MEDICARE

## 2025-04-01 NOTE — TELEPHONE ENCOUNTER
Patient called the office today stating that she was prescribed Flagyl and cefdinir, her pharmacist said there might be some interaction with her cardiac meds, she is asking if it will be okay to take?  Please advise, thanks  Call back [phone]

## 2025-04-03 RX ORDER — HYDROCHLOROTHIAZIDE 12.5 MG/1
12.5 TABLET ORAL
Qty: 90 TABLET | Refills: 0 | Status: CANCELLED | OUTPATIENT
Start: 2025-04-03

## 2025-04-04 ENCOUNTER — PHARMACY VISIT (OUTPATIENT)
Dept: PHARMACY | Facility: CLINIC | Age: 60
End: 2025-04-04
Payer: COMMERCIAL

## 2025-04-04 PROCEDURE — RXMED WILLOW AMBULATORY MEDICATION CHARGE

## 2025-04-04 RX ORDER — HYDROCHLOROTHIAZIDE 12.5 MG/1
12.5 TABLET ORAL
Qty: 90 TABLET | Refills: 0 | Status: CANCELLED | OUTPATIENT
Start: 2025-04-03

## 2025-04-04 RX ORDER — HYDROCHLOROTHIAZIDE 12.5 MG/1
12.5 TABLET ORAL
Qty: 90 TABLET | Refills: 0 | OUTPATIENT
Start: 2025-04-04

## 2025-04-08 PROCEDURE — RXMED WILLOW AMBULATORY MEDICATION CHARGE

## 2025-04-11 ENCOUNTER — EVALUATION (OUTPATIENT)
Dept: OCCUPATIONAL THERAPY | Facility: CLINIC | Age: 60
End: 2025-04-11
Payer: MEDICARE

## 2025-04-11 DIAGNOSIS — M79.641 RIGHT HAND PAIN: ICD-10-CM

## 2025-04-11 DIAGNOSIS — S63.91XA HAND SPRAIN, RIGHT, INITIAL ENCOUNTER: ICD-10-CM

## 2025-04-11 DIAGNOSIS — M19.041 PRIMARY OSTEOARTHRITIS OF RIGHT HAND: ICD-10-CM

## 2025-04-11 PROCEDURE — RXMED WILLOW AMBULATORY MEDICATION CHARGE

## 2025-04-11 NOTE — PROGRESS NOTES
Occupational Therapy                 Therapy Communication Note    Patient Name: Lili Doe  MRN: [id number]  Department: Today's Date: 4/11/2025     Discipline: Occupational Therapy          Missed Visit Reason:   Due to wait time, pt had another appointment rescheduled eval for next Tuesday    Missed Time: Cancel    Comment:

## 2025-04-15 ENCOUNTER — EVALUATION (OUTPATIENT)
Dept: OCCUPATIONAL THERAPY | Facility: CLINIC | Age: 60
End: 2025-04-15
Payer: MEDICARE

## 2025-04-15 DIAGNOSIS — M79.641 PAIN IN RIGHT HAND: Primary | ICD-10-CM

## 2025-04-15 PROCEDURE — 97110 THERAPEUTIC EXERCISES: CPT | Mod: GO | Performed by: OCCUPATIONAL THERAPIST

## 2025-04-15 PROCEDURE — 97165 OT EVAL LOW COMPLEX 30 MIN: CPT | Mod: GO | Performed by: OCCUPATIONAL THERAPIST

## 2025-04-15 ASSESSMENT — PAIN SCALES - GENERAL: PAINLEVEL_OUTOF10: 6

## 2025-04-15 ASSESSMENT — PAIN - FUNCTIONAL ASSESSMENT: PAIN_FUNCTIONAL_ASSESSMENT: 0-10

## 2025-04-15 ASSESSMENT — PAIN DESCRIPTION - DESCRIPTORS: DESCRIPTORS: ACHING;RADIATING

## 2025-04-15 NOTE — PROGRESS NOTES
Occupational Therapy    Evaluation/Treatment    Patient Name: Lili Doe  MRN: [id number]  OT Received on: 4/15/2025 OT Received On: 04/15/25  Time Calculation  Start Time: 1046  Stop Time: 1129  Time Calculation (min): 43 min  OT Evaluation Time Entry  OT Evaluation (Low) Time Entry: 20  OT Therapeutic Procedures Time Entry  Therapeutic Exercise Time Entry: 23    Visit # 1 of 6  Visits/Dates Authorized: 6    Current Problem:   Problem List Items Addressed This Visit             ICD-10-CM    Pain in right hand - Primary M79.641    Relevant Orders    Follow Up In Occupational Therapy     Insurance Type: Payor: MEDICARE / Plan: MEDICARE PART A AND B / Product Type: *No Product type* /    Assessment: Carpal tunnel syndrome contributing factors, with stiffness and tendon pain with gripping and pinching ADL tasks. R long finger and thumb MP joint pain with palpation, recommending icing and use of pulley rings for sustained grasping tasks.     OT Assessment Results: Decreased ADL status, Decreased upper extremity range of motion, Decreased upper extremity strength, Decreased fine motor control  Barriers to Discharge: None  Strengths: Ability to acquire knowledge, Coping skills, Insight into problems  Plan:  Treatment Interventions: UE strengthening/ROM, Neuromuscular reeducation, UE splinting  Treatment Interventions: UE strengthening/ROM, Neuromuscular reeducation, UE splinting  OT Plan: 1 x week for 6 weeks  Frequency: 1 x week  Duration: 6 weeks  Onset Date: 03/25/25  Certification Period Start Date: 04/15/25  Certification Period End Date: 07/14/25  Number of Treatments Authorized: MN  Rehab Potential: Good  Plan of Care Agreement: Patient    Subjective   Current Problem:  1. Pain in right hand  Follow Up In Occupational Therapy        General:   OT Received On: 04/15/25  General  Reason for Referral: ADL impairment Right hand  Referred By: GORGE Fonseca PA-C  Past Medical History Relevant to Rehab: long standing  numbness and dull pain R long finger, and also volar thumb; no trauma or falls, onset worsen 1 month; previously carrying heavy trays, repetitive work  Preferred Learning Style: verbal, visual, written  Precautions:  UE Weight Bearing Status: Weight Bearing as Tolerated  Splinting: NA  Precautions Comment: currently no splint or braces  Pain:  Pain Assessment  Pain Assessment: 0-10  0-10 (Numeric) Pain Score: 6  Pain Location: Hand  Pain Orientation: Right  Pain Radiating Towards: pain with use, gripping  Pain Descriptors: Aching, Radiating  Pain Frequency: Constant/continuous  Patient's Stated Pain Goal: No pain    Objective   Cognition:  Overall Cognitive Status: Within Functional Limits      Home Living:  Type of Home: Apartment  Prior Function:  Level of [place]: Independent with ADLs and functional transfers, Independent with homemaking with ambulation  Hand Dominance: Right  IADL History:  Occupation: Unemployed  Type of Occupation: , school kitchen  ADL:  ADL Comments: UEFI completed see for details  Modalities:  Modalities Used: Yes  Modality 1: Untimed Cold Pack, Untimed Hotpack  Splinting:  Splinting Comments: pulley ring to thumb and long fingers; next visit review HS splinting  Therapy/Activity: Therapeutic Exercise  Therapeutic Exercise Performed: Yes  Therapeutic Exercise Activity 1: completed 1 x 5 reps intrinsic stretching R hand  Therapeutic Exercise Activity 2: table top MP extension x 3 reps and rolling palmar add to HEP  Therapeutic Exercise Activity 3: completed tendon gliding 1 x 5 reps flat fist/intrinsic plus; with wrist and isolated FDS; tolerated well  Therapeutic Exercise Activity 4: coban wrap for edema and pain control, retrograde edema massage      Sensation:  Light Touch: Partial deficits in the RUE  Sensation Comment: further testing  Strength:  Strength Comments: see  and pinch  Extremities: RUE   RUE : Exceptions to WFL  RUE AROM (degrees)  R Shoulder Flexion   0-170: 160 Degrees  R Elbow Flexion/Extension 0-135-150: 145 °  R Forearm Pronation  0-80-90: 80 Degrees  R Forearm Supination  0-80-90: 75 Degrees  R Wrist Flexion 0-80: 50 Degrees  R Wrist Extension 0-70: 55 Degrees and LUE   LUE: Within Functional Limits  Right thumb MP ext/flexion 0/40 degrees; IP WNL  Right long finger 3 cm DPC; limited with intrinsic tightness (+)  Outcome Measures: OT Adult Other Outcome Measures  Other Outcome Measures: UEFI score 40/80    OP EDUCATION:  Education  Individual(s) Educated: Patient  Education Provided: Diagnosis & Precautions, Symptom management, POC discussed and agreed upon  Home Program: AROM, Activity modification, Modalities, Tendon gliding, Handout issued, PROM, Strengthening  Diagnosis and Precautions: pain and weakness of right hand, sensory impairment  Risk and Benefits Discussed with Patient/Caregiver/Other: yes  Patient/Caregiver Demonstrated Understanding: yes  Plan of Care Discussed and Agreed Upon: yes  Patient Response to Education: Patient/Caregiver Verbalized Understanding of Information, Patient/Caregiver Performed Return Demonstration of Exercises/Activities, Patient/Caregiver Asked Appropriate Questions  Education Comment: home program and handout issued; next visit provide median nerve gliding    Goals:  Active       OT Problem       Patient will demo right  to RICKS 245 degrees without pain (RLF) for opening jars and carrying trays and bins. (Progressing)       Start:  04/15/25    Expected End:  07/14/25            Pt will demo Right  strength to 55 lbs for carrying groceries, and report 2-3 methods for reducing numbness and application to daily ADL tasks. (Progressing)       Start:  04/15/25    Expected End:  07/14/25            PATIENT WILL SHOW A SIGNIFICANT CHANGE IN UEFI PATIENT REPORTED OUTCOME TOOL TO DEMONSTRATE SUBJECTIVE IMPROVEMENT (Progressing)       Start:  04/15/25    Expected End:  07/14/25            PATIENT WILL DEMONSTRATE  INDEPENDENCE IN HOME PROGRAM FOR SUPPORT OF PROGRESSION (Progressing)       Start:  04/15/25    Expected End:  07/14/25            PATIENT WILL REPORT PAIN OF 0-2/10 DEMONSTRATING A REDUCTION OF OVERALL PAIN (Progressing)       Start:  04/15/25    Expected End:  07/14/25

## 2025-04-17 ENCOUNTER — PHARMACY VISIT (OUTPATIENT)
Dept: PHARMACY | Facility: CLINIC | Age: 60
End: 2025-04-17
Payer: COMMERCIAL

## 2025-04-22 ENCOUNTER — TREATMENT (OUTPATIENT)
Dept: OCCUPATIONAL THERAPY | Facility: CLINIC | Age: 60
End: 2025-04-22
Payer: MEDICARE

## 2025-04-22 DIAGNOSIS — M79.641 PAIN IN RIGHT HAND: ICD-10-CM

## 2025-04-22 NOTE — PROGRESS NOTES
Occupational Therapy                 Therapy Communication Note    Patient Name: Lili Doe  MRN: [id number]  Department:   Room: Room/bed info not found  Today's Date: 4/22/2025     Discipline: Occupational Therapy    Missed Time: Cancel    Comment:Missed visit due to scheduling issues/conflict

## 2025-04-23 ENCOUNTER — APPOINTMENT (OUTPATIENT)
Dept: ORTHOPEDIC SURGERY | Facility: CLINIC | Age: 60
End: 2025-04-23
Payer: MEDICARE

## 2025-04-23 NOTE — PROGRESS NOTES
Subjective      Chief Complaint   Patient presents with    Right Hand - Follow-up        No surgery found     HPI  This 59 year old established patient presents for reevaluation and treatment of right hand pain. She states that her right hand began to hurt several weeks ago. She denies any recent injury or trauma to her right hand. Patient does complain of swelling and pain with making a fist. She rates right hand pain at 8/10 and states it is worse with and aggravated by attempting to hold and release objects in her right hand.  She does get intermittent numbness in her right hand when she wakes up in the morning. She is right hand dominant. She has tried and failed OTC tylenol and NSAIDS as well as meloxicam. She has also tried prednisone with some relief of the right hand pain.  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  She currently has been written off of work until 3/24 however she doubts she can return to her normal duties for the rest of the school year.  She is currently attending OT and is also performing her exercises as home as directed.     CARDIOLOGY:   Negative for chest pain, shortness of breath.   RESPIRATORY:   Negative for chest pain, shortness of breath.   MUSCULOSKELETAL:   See HPI for details.   NEUROLOGY:   Negative for tingling, numbness, weakness.    Objective    There were no vitals filed for this visit.    Physical Exam  GENERAL:          General Appearance:  This is a pleasant patient with appropriate affect, in no acute distress.   DERMATOLOGY:          Skin: skin at the neck, upper and lower back, and trunk is intact. There is no evidence of skin rash, skin breakdown or ulceration, or atrophic skin change.   EXTREMITIES:          Vascular:  Right, left hands and feet are warm with good color and pulses. Right and left calf and thigh are nontender and nonswollen.    NEUROLOGICAL:          Orientation:  Patient is alert and oriented to person, place, time and situation. Right and left upper and lower extremity motor and sensory examinations are intact.      MUSCULOSKELETAL: Neck: Nontender. No pain with range of motion.  Left hand: Nontender no pain limitation with range of motion.  Right hand: There is mild pain with palpation over the volar aspect of the right hand and that the right thumb carpometacarpal joint.  There is no pain dorsally.  There is no evidence of trigger finger or catching locking of any of the right hand fingers.  Phalen's is quizzical.  Phalen's is negative on the left.  Finkelstein's is negative.  There is  pain with range of motion at the right wrist.  Compartments are soft.  No evidence of skin infection, abscess, gout or infectious process on examination today.  Brisk capillary refill.  Radial pulses readily palpable.  Back: There is diffuse tenderness at the lumbar spine.  Straight leg raising appears to be negative bilaterally.    AP and lateral x-rays of the right hand done in the office  show osteoarthritis at the metacarpal bones of the right hand.  Previous x-rays of the hips including AP pelvis did show osteoarthritis at the lower lumbar spine.  Reviewed the x-rays listed above with the patient in the office today.    XR chest 1 view    Result Date: 3/5/2025  Interpreted By:  Alexsandra Ward, STUDY: XR CHEST 1 VIEW;  3/5/2025 12:37 pm   INDICATION: Signs/Symptoms:dizziness.   COMPARISON: 01/21/2025   ACCESSION NUMBER(S): [id number]   ORDERING CLINICIAN: CROW REYES   FINDINGS: AP radiograph of the chest was provided.       CARDIOMEDIASTINAL SILHOUETTE: Cardiomediastinal silhouette is stable in size and configuration.   LUNGS: No consolidation, pulmonary edema, pleural effusion, or pneumothorax.   ABDOMEN: No remarkable upper abdominal findings.   BONES: No acute osseous changes.       No evidence of acute cardiopulmonary process.   Signed by:  Alexsandra Ward 3/5/2025 12:41 PM Dictation workstation:   [id number]    CT cervical spine wo IV contrast    Result Date: 3/5/2025  Interpreted By:  Nataliia Ham, STUDY: CT CERVICAL SPINE WO IV CONTRAST;  3/5/2025 12:03 pm   INDICATION: Signs/Symptoms:neck pain.   COMPARISON: Cervical spine CT 03/26/2024.   ACCESSION NUMBER(S): [id number]   ORDERING CLINICIAN: CROW REYES   TECHNIQUE: Axial CT images of the cervical spine are obtained. Axial, coronal and sagittal reconstructions are provided for review.   FINDINGS: No acute fracture or malalignment. Vertebral body heights are maintained. Atlantoaxial and atlantooccipital joint alignment is preserved. Facets are well aligned.   Multilevel degenerative changes are again seen and stable, including mild intervertebral disc space narrowing, anterior endplate osteophyte formation and uncovertebral joint hypertrophy from C2 through T1. No significant spinal canal narrowing. Diffuse bilateral facet joint hypertrophy is again seen and stable with mild right C3-C4, moderate bilateral C4-C5 and mild bilateral C5-C6 neural foraminal narrowing.   No prevertebral hematoma.       1. No acute bony abnormality of the cervical spine.   2. Stable multilevel degenerative disc disease and bilateral facet joint arthrosis with varying degrees of bilateral neural foraminal narrowing throughout the cervical spine, as detailed above.   Signed by: Nataliia Ham 3/5/2025 12:26 PM Dictation workstation:   [id number]    ECG 12 lead    Result Date: 3/5/2025  Normal sinus rhythm Normal ECG When compared with ECG of 21-JAN-2025 15:49, Criteria for Septal infarct are no longer Present Confirmed by Ricco Cortez ([id number]) on 3/5/2025 12:26:00 PM    CT head wo IV contrast    Result Date: 3/5/2025  Interpreted By:  Nataliia Ham, STUDY: CT HEAD WO IV CONTRAST;  3/5/2025 12:03 pm   INDICATION: Signs/Symptoms:lightheaded.   COMPARISON: CT head 08/31/2022.   ACCESSION NUMBER(S): [id number]   ORDERING  "CLINICIAN: CROW REYES   TECHNIQUE: Noncontrast axial CT scan of head was performed. Multiplanar reconstructions   FINDINGS: No evidence of a large territorial infarct. Gray-white matter interfaces are preserved. No acute intracranial hemorrhage, mass effect, midline shift, or herniation. No evidence of hydrocephalus. The ventricles and sulci are unremarkable for age.   The pituitary appears thinned/flattened, measuring about 2.4 mm in thickness and the sella is filled with CSF, resulting in an \"empty sella\" appearance. This is similar compared with 2022.   Stable size and appearance of the 1.9 x 1.3 cm left sphenoid sinus mucous retention cyst versus polyp in the lateral aspect of the left sphenoid sinus. The remainder of the paranasal sinuses and mastoid air cells are clear. No acute osseous abnormality of the calvarium. No destructive bone lesion.       1. No CT evidence of acute intracranial abnormality. Consider follow-up with MRI as warranted.   2. Stable \"empty sella\" appearance and stable 1.9 cm left sphenoid sinus mucous retention cyst versus polyp compared with the 2022 exam.     Signed by: Nataliia Ham 3/5/2025 12:17 PM Dictation workstation:   [id number]       Lili was seen today for follow-up.  Diagnoses and all orders for this visit:  Numbness of right hand (Primary)  -     EMG & nerve conduction; Future  Right hand pain  -     EMG & nerve conduction; Future  Primary osteoarthritis of right hand  -     EMG & nerve conduction; Future  Lumbar back pain  -     Referral to Physical Therapy; Future  Osteoarthritis of lumbosacral spine  -     Referral to Physical Therapy; Future       Options are discussed with the patient in detail. The patient is given a prescription for physical therapy to evaluate and treat with gentle strengthening and ROM exercises with modalities as needed. The patient is instructed regarding activity modification and risk for further injury with falling or trauma, ice, " provider directed at home gentle strengthening and ROM exercises, and the appropriate use of Tylenol as needed for pain with its potential adverse reactions and side effects.  In addition this numbness of the right hand and right hand pain needs to be evaluated with EMG and nerve conduction studies and referral has been put in the record for the studies.  The patient understands.  I estimate that this patient is not able to return to work before May 30, 2025.  Follow up after EMG and nerve duction studies have been completed or sooner as needed. Please note that this report has been produced using speech recognition software.  It may contain errors related to grammar, punctuation or spelling.  Electronically signed, but not reviewed.     Thong Crouch MD

## 2025-04-24 ENCOUNTER — OFFICE VISIT (OUTPATIENT)
Dept: ORTHOPEDIC SURGERY | Facility: CLINIC | Age: 60
End: 2025-04-24
Payer: MEDICARE

## 2025-04-24 DIAGNOSIS — M54.50 LUMBAR BACK PAIN: ICD-10-CM

## 2025-04-24 DIAGNOSIS — M19.041 PRIMARY OSTEOARTHRITIS OF RIGHT HAND: ICD-10-CM

## 2025-04-24 DIAGNOSIS — M47.817 OSTEOARTHRITIS OF LUMBOSACRAL SPINE: ICD-10-CM

## 2025-04-24 DIAGNOSIS — R20.0 NUMBNESS OF RIGHT HAND: Primary | ICD-10-CM

## 2025-04-24 DIAGNOSIS — M79.641 RIGHT HAND PAIN: ICD-10-CM

## 2025-04-24 PROCEDURE — 99213 OFFICE O/P EST LOW 20 MIN: CPT | Performed by: ORTHOPAEDIC SURGERY

## 2025-04-24 ASSESSMENT — LIFESTYLE VARIABLES
SKIP TO QUESTIONS 9-10: 1
HOW OFTEN DURING THE LAST YEAR HAVE YOU NEEDED AN ALCOHOLIC DRINK FIRST THING IN THE MORNING TO GET YOURSELF GOING AFTER A NIGHT OF HEAVY DRINKING: NEVER
HOW OFTEN DO YOU HAVE A DRINK CONTAINING ALCOHOL: MONTHLY OR LESS
HOW OFTEN DURING THE LAST YEAR HAVE YOU HAD A FEELING OF GUILT OR REMORSE AFTER DRINKING: NEVER
HAVE YOU OR SOMEONE ELSE BEEN INJURED AS A RESULT OF YOUR DRINKING: NO
HOW OFTEN DURING THE LAST YEAR HAVE YOU BEEN UNABLE TO REMEMBER WHAT HAPPENED THE NIGHT BEFORE BECAUSE YOU HAD BEEN DRINKING: NEVER
HAS A RELATIVE, FRIEND, DOCTOR, OR ANOTHER HEALTH PROFESSIONAL EXPRESSED CONCERN ABOUT YOUR DRINKING OR SUGGESTED YOU CUT DOWN: NO
AUDIT TOTAL SCORE: 1
HOW OFTEN DURING THE LAST YEAR HAVE YOU FOUND THAT YOU WERE NOT ABLE TO STOP DRINKING ONCE YOU HAD STARTED: NEVER
HOW OFTEN DURING THE LAST YEAR HAVE YOU FAILED TO DO WHAT WAS NORMALLY EXPECTED FROM YOU BECAUSE OF DRINKING: NEVER
HOW OFTEN DO YOU HAVE SIX OR MORE DRINKS ON ONE OCCASION: NEVER
HOW MANY STANDARD DRINKS CONTAINING ALCOHOL DO YOU HAVE ON A TYPICAL DAY: 1 OR 2
AUDIT-C TOTAL SCORE: 1

## 2025-04-24 ASSESSMENT — PATIENT HEALTH QUESTIONNAIRE - PHQ9
2. FEELING DOWN, DEPRESSED OR HOPELESS: NOT AT ALL
SUM OF ALL RESPONSES TO PHQ9 QUESTIONS 1 AND 2: 0
1. LITTLE INTEREST OR PLEASURE IN DOING THINGS: NOT AT ALL

## 2025-04-24 ASSESSMENT — PAIN SCALES - GENERAL: PAINLEVEL_OUTOF10: 7

## 2025-04-24 ASSESSMENT — ENCOUNTER SYMPTOMS
LOSS OF SENSATION IN FEET: 0
DEPRESSION: 0
OCCASIONAL FEELINGS OF UNSTEADINESS: 0

## 2025-04-24 ASSESSMENT — PAIN - FUNCTIONAL ASSESSMENT: PAIN_FUNCTIONAL_ASSESSMENT: 0-10

## 2025-04-24 ASSESSMENT — PAIN DESCRIPTION - DESCRIPTORS: DESCRIPTORS: ACHING

## 2025-04-28 PROCEDURE — RXMED WILLOW AMBULATORY MEDICATION CHARGE

## 2025-04-29 ENCOUNTER — PHARMACY VISIT (OUTPATIENT)
Dept: PHARMACY | Facility: CLINIC | Age: 60
End: 2025-04-29
Payer: MEDICARE

## 2025-04-29 PROCEDURE — RXOTC WILLOW AMBULATORY OTC CHARGE

## 2025-05-01 ENCOUNTER — TREATMENT (OUTPATIENT)
Dept: OCCUPATIONAL THERAPY | Facility: CLINIC | Age: 60
End: 2025-05-01
Payer: MEDICARE

## 2025-05-01 DIAGNOSIS — M79.641 PAIN IN RIGHT HAND: Primary | ICD-10-CM

## 2025-05-01 PROCEDURE — 97763 ORTHC/PROSTC MGMT SBSQ ENC: CPT | Mod: GO | Performed by: OCCUPATIONAL THERAPIST

## 2025-05-01 PROCEDURE — 97110 THERAPEUTIC EXERCISES: CPT | Mod: GO | Performed by: OCCUPATIONAL THERAPIST

## 2025-05-01 PROCEDURE — 97140 MANUAL THERAPY 1/> REGIONS: CPT | Mod: GO | Performed by: OCCUPATIONAL THERAPIST

## 2025-05-01 ASSESSMENT — PAIN - FUNCTIONAL ASSESSMENT: PAIN_FUNCTIONAL_ASSESSMENT: 0-10

## 2025-05-01 ASSESSMENT — PAIN SCALES - GENERAL: PAINLEVEL_OUTOF10: 6

## 2025-05-05 ENCOUNTER — TREATMENT (OUTPATIENT)
Dept: OCCUPATIONAL THERAPY | Facility: CLINIC | Age: 60
End: 2025-05-05
Payer: MEDICARE

## 2025-05-05 DIAGNOSIS — M79.641 PAIN IN RIGHT HAND: ICD-10-CM

## 2025-05-05 PROCEDURE — RXMED WILLOW AMBULATORY MEDICATION CHARGE

## 2025-05-05 PROCEDURE — 97530 THERAPEUTIC ACTIVITIES: CPT | Mod: GO | Performed by: OCCUPATIONAL THERAPIST

## 2025-05-05 PROCEDURE — 97110 THERAPEUTIC EXERCISES: CPT | Mod: GO | Performed by: OCCUPATIONAL THERAPIST

## 2025-05-05 ASSESSMENT — PAIN SCALES - GENERAL: PAINLEVEL_OUTOF10: 6

## 2025-05-05 ASSESSMENT — PAIN DESCRIPTION - DESCRIPTORS: DESCRIPTORS: ACHING

## 2025-05-05 ASSESSMENT — PAIN - FUNCTIONAL ASSESSMENT: PAIN_FUNCTIONAL_ASSESSMENT: 0-10

## 2025-05-05 NOTE — PROGRESS NOTES
Occupational Therapy    Treatment    Patient Name: Lili Doe  MRN: [id number]  OT Received on: 5/5/2025 OT Received On: 05/05/25  Time Calculation  Start Time: 1150  Stop Time: 1230  Time Calculation (min): 40 min  OT Therapeutic Procedures Time Entry  Therapeutic Activity Time Entry: 15  Therapeutic Exercise Time Entry: 25    Visit # 3 of 6  Visits/Dates Authorized: 6    Current Problem:   Problem List Items Addressed This Visit           ICD-10-CM    Pain in right hand M79.641     Insurance Type: Payor: MEDICARE / Plan: MEDICARE PART A AND B / Product Type: *No Product type* /    Assessment: Pt doing well, EMG scheduled, less thumb UCL ligament pain, recommending support and protection with thumb spica, carpal tunnel wrist splint at night for reducing her numbness at night;  stiffness and tendon pain with gripping and pinching ADL tasks. Goals to reduce the R long finger and thumb MP joint pain.  Continue to recommend icing and use of pulley rings for sustained grasping tasks. Pt has EMG scheduled.      Plan: Plan 1 x week for 6 weeks.        Subjective   Current Problem:  1. Pain in right hand  Follow Up In Occupational Therapy        General:   OT Received On: 05/05/25     Precautions:  UE Weight Bearing Status: Weight Bearing as Tolerated  Pain:  Pain Assessment  Pain Assessment: 0-10  0-10 (Numeric) Pain Score: 6  Pain Location: Hand  Pain Orientation: Right  Pain Radiating Towards: at the thumb area, but not wearing thumb spica  Pain Descriptors: Aching  Pain Frequency: Intermittent    Objective   Modalities: Fluidotherapy right hand     Splinting: Thumb spica neutral MP joint; full time wearing, instructed with care and wearing schedule.   Therapy/Activity:  Therapy/Activity: Therapeutic Exercise  Therapeutic Exercise Performed: Yes  Therapeutic Exercise Activity 1: warm up wrist and thumb/fingers with 2 x 10 reps flex/ext during fluidotherapy with direct therapist supervision    Therapeutic Exercise  Activity 2: Completed gripper 45 lbs. With 25 reps  and reach;   Completed blue and green pinch pins with x 2 lateral and tripod pinch;   Reviewed wrist curls ext/flexion 10 reps x2, completed 10 reps of tendon gliding; FDS 5 reps each finger.  Therapeutic Activity 1: Completed median nerve gliding x 2; continue daily home program. Handout issued for reducing symptoms;  Manual Therapy  Manual Therapy Performed:  deferred due to time       Extremities:  Right thumb MP ext/flexion 0/40 degrees; IP 0/45  Right long finger 2 cm (was 3 cm) DPC; limited with intrinsic tightness (+)  Outcome Measures:   UEFI completed    OP EDUCATION: Handouts issued; HEP provided for finger, wrist ROM and median nerve gliding.       Goals:  Active       OT Problem       Patient will demo right  to RICKS 245 degrees without pain (RLF) for opening jars and carrying trays and bins. (Progressing)       Start:  04/15/25    Expected End:  07/14/25            Pt will demo Right  strength to 55 lbs for carrying groceries, and report 2-3 methods for reducing numbness and application to daily ADL tasks. (Progressing)       Start:  04/15/25    Expected End:  07/14/25            PATIENT WILL SHOW A SIGNIFICANT CHANGE IN UEFI PATIENT REPORTED OUTCOME TOOL TO DEMONSTRATE SUBJECTIVE IMPROVEMENT (Progressing)       Start:  04/15/25    Expected End:  07/14/25            PATIENT WILL DEMONSTRATE INDEPENDENCE IN HOME PROGRAM FOR SUPPORT OF PROGRESSION (Progressing)       Start:  04/15/25    Expected End:  07/14/25            PATIENT WILL REPORT PAIN OF 0-2/10 DEMONSTRATING A REDUCTION OF OVERALL PAIN (Progressing)       Start:  04/15/25    Expected End:  07/14/25

## 2025-05-09 PROCEDURE — RXMED WILLOW AMBULATORY MEDICATION CHARGE

## 2025-05-10 PROCEDURE — RXMED WILLOW AMBULATORY MEDICATION CHARGE

## 2025-05-13 ENCOUNTER — TREATMENT (OUTPATIENT)
Dept: OCCUPATIONAL THERAPY | Facility: CLINIC | Age: 60
End: 2025-05-13
Payer: MEDICARE

## 2025-05-13 DIAGNOSIS — M79.641 PAIN IN RIGHT HAND: ICD-10-CM

## 2025-05-13 PROCEDURE — RXMED WILLOW AMBULATORY MEDICATION CHARGE

## 2025-05-13 PROCEDURE — 97110 THERAPEUTIC EXERCISES: CPT | Mod: GO,CO

## 2025-05-13 ASSESSMENT — PAIN SCALES - GENERAL: PAINLEVEL_OUTOF10: 6

## 2025-05-13 ASSESSMENT — PAIN DESCRIPTION - DESCRIPTORS: DESCRIPTORS: SORE;ACHING;OTHER (COMMENT)

## 2025-05-13 ASSESSMENT — PAIN - FUNCTIONAL ASSESSMENT: PAIN_FUNCTIONAL_ASSESSMENT: 0-10

## 2025-05-13 NOTE — PROGRESS NOTES
"Occupational Therapy Treatment  Patient Name: Lili Doe  MRN: [id number]  OT Received on: 5/13/2025 OT Received On: 05/13/25  Time:  Time Calculation  Start Time: 1030  Stop Time: 1120  Time Calculation (min): 50 min  OT Therapeutic Procedures Time Entry  Therapeutic Exercise Time Entry: 45  Non-Billable Time  Non-billable time: 5  Non-billable time reason: ice  Insurance:  Visit Number: 4 of 6  4/4/25 - MEDICARE A/B / NO AUTH / [place] VISITS /  NOT MET / MEDICAID SEC ACTIVE / PER RTE  JS    Subjective   Problem List Items Addressed This Visit           ICD-10-CM       Musculoskeletal and Injuries    Pain in right hand M79.641     Referred by: EMG scheduled  on 5/22/25 ,  follow up with doctor Miko 5/29/25    Patient reports \" It is still pretty sore. \" Pt no longer wearing thumb spica splint. Goals and precautions reviewed with patient, she verbalized understanding.     Precautions:     Performing HEP?: Yes    Pain: 6/10 currently., up to 8/10 at night, deep aching. Intermittent . Using a heat glove daily, no ice. Took gabapenten the last 3-4 of 7 days.   Pain Assessment  Pain Assessment: 0-10  0-10 (Numeric) Pain Score: 6 (up to 8/10 at night.)  Pain Location: Hand  Pain Orientation: Right  Pain Radiating Towards: base of CMC  Pain Descriptors: Sore, Aching, Other (Comment) (deep)  Pain Frequency: Intermittent  Objective     Edema: pt notes along base of CMC in R hand.    Sensory: impaired  Numbness/Tingling: only  noted at night time  Treatment:  Provided  patient with written  information on pain management   Modalities:  Educated pt in benefit of home paraffin unit for pain relief. Written information for purchase provided.   Ice applied x 5 min at end of session during wrap up to reduce edema and inflammation. Educated pt in ice bottle rolls to reduce edema.   AROM while in fluido therapy x 10 min to promote muscle movement during session  KT taping completed by writer for  thumb support . Educated " pt on placement technique across wrist and Forearm and up thumb  and provided with template for home application. Pt verbalized understanding.                  Therapeutic Exercise:  Graded clothespins completed 2 x 15 reps each of yellow ( 1#) and red ( 2#) with R hand and noted fatigue. One rest break between sets.    Completed gripper 40 ( was 45)  lbs. With 20 reps  and reach, increased pain noted in R shoulder capsule and biceps.   Post-tx pain: 4/10,noted min fatigue at end of session.     Assessment/Plan pt highly motivated to return to function. Limited by pain in thumb, R shoulder capsule. Pt to follow through with modalities for pain control and Ktaping for support. Continue with low resistance gripping and functional activity as tolerated next session. To explore options for treatment at next follow up with Dr. Crouch.   OP EDUCATION:  Education  Individual(s) Educated: Patient  Education Provided: POC discussed and agreed upon, Risk and benefits of OT discussed with patient or other, Edema control, Symptom management  Home Program: AROM, Activity modification, Modalities, Tendon gliding, Handout issued, PROM, Strengthening  Risk and Benefits Discussed with Patient/Caregiver/Other: yes  Patient/Caregiver Demonstrated Understanding: yes  Plan of Care Discussed and Agreed Upon: yes  Patient Response to Education: Patient/Caregiver Verbalized Understanding of Information, Patient/Caregiver Performed Return Demonstration of Exercises/Activities, Patient/Caregiver Asked Appropriate Questions    Goals:  Active       OT Problem       Patient will demo right  to RICKS 245 degrees without pain (RLF) for opening jars and carrying trays and bins. (Progressing)       Start:  04/15/25    Expected End:  07/14/25            Pt will demo Right  strength to 55 lbs for carrying groceries, and report 2-3 methods for reducing numbness and application to daily ADL tasks. (Progressing)       Start:  04/15/25     Expected End:  07/14/25            PATIENT WILL SHOW A SIGNIFICANT CHANGE IN UEFI PATIENT REPORTED OUTCOME TOOL TO DEMONSTRATE SUBJECTIVE IMPROVEMENT (Progressing)       Start:  04/15/25    Expected End:  07/14/25            PATIENT WILL DEMONSTRATE INDEPENDENCE IN HOME PROGRAM FOR SUPPORT OF PROGRESSION (Progressing)       Start:  04/15/25    Expected End:  07/14/25            PATIENT WILL REPORT PAIN OF 0-2/10 DEMONSTRATING A REDUCTION OF OVERALL PAIN (Progressing)       Start:  04/15/25    Expected End:  07/14/25

## 2025-05-19 ENCOUNTER — APPOINTMENT (OUTPATIENT)
Dept: OCCUPATIONAL THERAPY | Facility: CLINIC | Age: 60
End: 2025-05-19
Payer: MEDICARE

## 2025-05-19 ENCOUNTER — HOSPITAL ENCOUNTER (OUTPATIENT)
Dept: RADIOLOGY | Facility: CLINIC | Age: 60
Discharge: HOME | End: 2025-05-19
Payer: MEDICARE

## 2025-05-19 DIAGNOSIS — M48.061 SPINAL STENOSIS, LUMBAR REGION WITHOUT NEUROGENIC CLAUDICATION: ICD-10-CM

## 2025-05-19 DIAGNOSIS — M54.50 LOW BACK PAIN, UNSPECIFIED: ICD-10-CM

## 2025-05-19 DIAGNOSIS — M54.17 RADICULOPATHY, LUMBOSACRAL REGION: ICD-10-CM

## 2025-05-19 DIAGNOSIS — G89.29 OTHER CHRONIC PAIN: ICD-10-CM

## 2025-05-19 PROCEDURE — 72148 MRI LUMBAR SPINE W/O DYE: CPT | Performed by: RADIOLOGY

## 2025-05-19 PROCEDURE — 72148 MRI LUMBAR SPINE W/O DYE: CPT

## 2025-05-20 ENCOUNTER — PHARMACY VISIT (OUTPATIENT)
Dept: PHARMACY | Facility: CLINIC | Age: 60
End: 2025-05-20
Payer: COMMERCIAL

## 2025-05-22 ENCOUNTER — HOSPITAL ENCOUNTER (OUTPATIENT)
Facility: CLINIC | Age: 60
Discharge: HOME | End: 2025-05-22
Payer: MEDICARE

## 2025-05-22 DIAGNOSIS — M19.041 PRIMARY OSTEOARTHRITIS OF RIGHT HAND: ICD-10-CM

## 2025-05-22 DIAGNOSIS — M79.641 RIGHT HAND PAIN: ICD-10-CM

## 2025-05-22 DIAGNOSIS — R20.0 NUMBNESS OF RIGHT HAND: ICD-10-CM

## 2025-05-22 PROCEDURE — 95910 NRV CNDJ TEST 7-8 STUDIES: CPT | Performed by: STUDENT IN AN ORGANIZED HEALTH CARE EDUCATION/TRAINING PROGRAM

## 2025-05-22 PROCEDURE — 95886 MUSC TEST DONE W/N TEST COMP: CPT | Performed by: STUDENT IN AN ORGANIZED HEALTH CARE EDUCATION/TRAINING PROGRAM

## 2025-05-22 NOTE — PROGRESS NOTES
Subjective      Chief Complaint   Patient presents with    Right Hand - Follow-up        No surgery found     HPI  This 59 year old established patient presents for reevaluation and treatment of right hand pain. She states that her right hand began to hurt several weeks ago. She denies any recent injury or trauma to her right hand. Patient does complain of swelling and pain with making a fist. She rates right hand pain at 8/10 and states it is worse with and aggravated by attempting to hold and release objects in her right hand.  She does get intermittent numbness in her right hand when she wakes up in the morning. She is right hand dominant. She has tried and failed OTC tylenol and NSAIDS as well as meloxicam. She has also tried prednisone with some relief of the right hand pain.  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  She currently has been written off of work until 3/24 however she doubts she can return to her normal duties for the rest of the school year.  She is currently attending OT and is also performing her exercises as home as directed.   Patient presents to the office today to discuss EMG results of her right hand pain 6/10. She is still currently attending OT. She can only use the hand for a limited time before the pain starts. Thumb area is always swollen.     CARDIOLOGY:   Negative for chest pain, shortness of breath.   RESPIRATORY:   Negative for chest pain, shortness of breath.   MUSCULOSKELETAL:   See HPI for details.   NEUROLOGY:   Negative for tingling, numbness, weakness.    Objective    There were no vitals filed for this visit.    Physical Exam  GENERAL:          General Appearance:  This is a pleasant patient with appropriate affect, in no acute distress.   DERMATOLOGY:          Skin: skin at the neck, upper and lower back, and trunk is intact. There is no evidence of skin  rash, skin breakdown or ulceration, or atrophic skin change.   EXTREMITIES:          Vascular:  Right, left hands and feet are warm with good color and pulses. Right and left calf and thigh are nontender and nonswollen.   NEUROLOGICAL:          Orientation:  Patient is alert and oriented to person, place, time and situation. Right and left upper and lower extremity motor and sensory examinations are intact.      MUSCULOSKELETAL: Neck: Nontender. No pain with range of motion.  Left hand: Nontender no pain limitation with range of motion.  Right hand: There is mild pain with palpation over the volar aspect of the right hand and that the right thumb carpometacarpal joint.  There is no pain dorsally.  There is no evidence of trigger finger or catching locking of any of the right hand fingers.  Phalen's is positive on the right hand. Phalen's is negative on the left.  Finkelstein's is negative.  There is  pain with range of motion at the right wrist.  Compartments are soft.  No evidence of skin infection, abscess, gout or infectious process on examination today.  Brisk capillary refill.  Radial pulses readily palpable.  Back: There is diffuse tenderness at the lumbar spine.  Straight leg raising appears to be negative bilaterally.    AP and lateral x-rays of the right hand done in the office  show osteoarthritis at the metacarpal bones of the right hand.  Previous x-rays of the hips including AP pelvis did show osteoarthritis at the lower lumbar spine.  Reviewed the x-rays listed above with the patient in the office today.    XR chest 1 view    Result Date: 3/5/2025  Interpreted By:  Alexsandra Ward, STUDY: XR CHEST 1 VIEW;  3/5/2025 12:37 pm   INDICATION: Signs/Symptoms:dizziness.   COMPARISON: 01/21/2025   ACCESSION NUMBER(S): [id number]   ORDERING CLINICIAN: CROW REYES   FINDINGS: AP radiograph of the chest was provided.       CARDIOMEDIASTINAL SILHOUETTE: Cardiomediastinal silhouette is stable in size and  configuration.   LUNGS: No consolidation, pulmonary edema, pleural effusion, or pneumothorax.   ABDOMEN: No remarkable upper abdominal findings.   BONES: No acute osseous changes.       No evidence of acute cardiopulmonary process.   Signed by: Alexsandra Ward 3/5/2025 12:41 PM Dictation workstation:   [id number]    CT cervical spine wo IV contrast    Result Date: 3/5/2025  Interpreted By:  Nataliia Ham, STUDY: CT CERVICAL SPINE WO IV CONTRAST;  3/5/2025 12:03 pm   INDICATION: Signs/Symptoms:neck pain.   COMPARISON: Cervical spine CT 03/26/2024.   ACCESSION NUMBER(S): [id number]   ORDERING CLINICIAN: CROW REYES   TECHNIQUE: Axial CT images of the cervical spine are obtained. Axial, coronal and sagittal reconstructions are provided for review.   FINDINGS: No acute fracture or malalignment. Vertebral body heights are maintained. Atlantoaxial and atlantooccipital joint alignment is preserved. Facets are well aligned.   Multilevel degenerative changes are again seen and stable, including mild intervertebral disc space narrowing, anterior endplate osteophyte formation and uncovertebral joint hypertrophy from C2 through T1. No significant spinal canal narrowing. Diffuse bilateral facet joint hypertrophy is again seen and stable with mild right C3-C4, moderate bilateral C4-C5 and mild bilateral C5-C6 neural foraminal narrowing.   No prevertebral hematoma.       1. No acute bony abnormality of the cervical spine.   2. Stable multilevel degenerative disc disease and bilateral facet joint arthrosis with varying degrees of bilateral neural foraminal narrowing throughout the cervical spine, as detailed above.   Signed by: Nataliia Ham 3/5/2025 12:26 PM Dictation workstation:   [id number]    ECG 12 lead    Result Date: 3/5/2025  Normal sinus rhythm Normal ECG When compared with ECG of 21-JAN-2025 15:49, Criteria for Septal infarct are no longer Present Confirmed by Ricco Cortez ([id number]) on 3/5/2025 12:26:00 PM    CT head wo  "IV contrast    Result Date: 3/5/2025  Interpreted By:  Nataliia Ham, STUDY: CT HEAD WO IV CONTRAST;  3/5/2025 12:03 pm   INDICATION: Signs/Symptoms:lightheaded.   COMPARISON: CT head 08/31/2022.   ACCESSION NUMBER(S): [id number]   ORDERING CLINICIAN: CROW REYES   TECHNIQUE: Noncontrast axial CT scan of head was performed. Multiplanar reconstructions   FINDINGS: No evidence of a large territorial infarct. Gray-white matter interfaces are preserved. No acute intracranial hemorrhage, mass effect, midline shift, or herniation. No evidence of hydrocephalus. The ventricles and sulci are unremarkable for age.   The pituitary appears thinned/flattened, measuring about 2.4 mm in thickness and the sella is filled with CSF, resulting in an \"empty sella\" appearance. This is similar compared with 2022.   Stable size and appearance of the 1.9 x 1.3 cm left sphenoid sinus mucous retention cyst versus polyp in the lateral aspect of the left sphenoid sinus. The remainder of the paranasal sinuses and mastoid air cells are clear. No acute osseous abnormality of the calvarium. No destructive bone lesion.       1. No CT evidence of acute intracranial abnormality. Consider follow-up with MRI as warranted.   2. Stable \"empty sella\" appearance and stable 1.9 cm left sphenoid sinus mucous retention cyst versus polyp compared with the 2022 exam.     Signed by: Nataliia Ham 3/5/2025 12:17 PM Dictation workstation:   [id number]     MR lumbar spine wo IV contrast  Result Date: 5/22/2025  Interpreted By:  Ariel Ojeda, STUDY: MR LUMBAR SPINE WO IV CONTRAST;  5/19/2025 11:45 am   INDICATION: M48.061 Spinal stenosis, lumbar region without neurogenic claudication G89.29 Other chronic pain M54.50 Low back pain, unspecified M54.17 Radiculopathy, lumbosacral region   COMPARISON: None.   ACCESSION NUMBER(S): [id number]   ORDERING CLINICIAN: OLIVA PAUL   TECHNIQUE: Sagittal STIR, sagittal T2, sagittal T1, axial T2, axial T1 weighted MRI " images of the lumbar spine were obtained without intravenous contrast administration.   FINDINGS: The coronal  images demonstrate a mild levocurvature of the lower thoracic and lumbar spine.   There is 2 mm of retrolisthesis of L5 on S1.   There are degenerative signal changes noted along endplates throughout the lumbar and visualized lower thoracic region most pronounced at the L5/S1 level.   The visualized spinal cord demonstrates no signal abnormality within it.  The conus medullaris is normally positioned terminating at the L1 level.   There is a 14 mm perineural cyst/Tarlov cyst noted within the right aspect of the spinal canal at the S2 level.   There is diminished disc signal and loss of disc height at the L5/S1 level compatible with degenerative disc disease.   At the L5/S1 level,  there is 2 mm of retrolisthesis of L5 on S1, posterior osteophytic spurring and posterior disc bulge along with degenerative facet changes contributing to mild overall narrowing of the thecal sac within the spinal canal. There is moderate to severe neural foraminal narrowing right greater than left.   At the L4/L5 level,  there are degenerative facet changes and mild ligamentum flavum hypertrophy along with a minimal posterior disc bulge contributing to mild spinal canal narrowing. There is no significant neural foraminal narrowing.   At the L3/L4 level,  there is a mild posterior disc bulge along with degenerative facet changes and ligamentum flavum hypertrophy. There is mild prominence of the epidural fat posteriorly within the spinal canal. There is moderate narrowing of the thecal sac in the AP dimension within the spinal canal. There is mild encroachment upon the neural foramen bilaterally left greater than right.   At the L2/L3 level,  there is a minimal posterior disc bulge along with mild degenerative facet changes and ligamentum flavum hypertrophy. There is mild prominence of the epidural fat posteriorly in the  spinal canal. The combination of findings contributes to mild narrowing of the thecal sac in the AP dimension within the spinal canal.   At the L1/L2 level,  there is mild posterior osteophytic spurring and posterior disc bulge slightly more pronounced to the right of midline along with degenerative facet changes contributing to mild encroachment upon the spinal canal. There is no significant neural foraminal narrowing.   At the T12/L1 level,  there are mild degenerative facet changes without significant spinal canal or neural foraminal narrowing.   At the T11/12 level, the sagittal images demonstrate a minimal posterior disc without significant spinal canal or neural foraminal narrowing.   At the T10/11 level, the sagittal images demonstrate a mild posterior disc/osteophyte complex and degenerative facet changes contributing to mild encroachment upon the ventral and dorsolateral spinal canal. No axial images were obtained through this level limiting further evaluation.   There is a 7 mm cystic focus noted along the lower pole of the right kidney.       The coronal  images demonstrate a mild levocurvature of the lower thoracic and lumbar spine.   There is 2 mm of retrolisthesis of L5 on S1.   There is multilevel spondylosis with varying degrees of spinal canal and neural foraminal narrowing as described above.   There is a 7 mm cystic focus noted along the lower pole of the right kidney.   MACRO: None.   Signed by: Ariel Ojeda 5/22/2025 9:00 AM Dictation workstation:   [id number]  EMG & nerve conduction   Listed below is reviewed with the patient in the office today.  Result Date: 5/22/2025  Impression: This is an abnormal study of the right upper extremity. There is evidence of mild right median neuropathy at the wrist.  The pathophysiology is demyelination.  These findings would be consistent with a clinical diagnosis of carpal tunnel syndrome. There is no definite evidence of right ulnar neuropathy, however  there is a slightly low amplitude at the above elbow response which is likely technical. There is no evidence of cervical radiculopathy in the right upper extremity. Brennen Quigley MD -------------------------------------------------------------------------------------------------------------------------------------------------------------------------------------------------------------------------------------------------------------- -------------------------------------------------------------------------------------------------------------------------------------------------------------------------------------------------------------------------------------------------------------- -----------     MR lumbar spine wo IV contrast  Result Date: 5/22/2025  Interpreted By:  Ariel Ojeda, STUDY: MR LUMBAR SPINE WO IV CONTRAST;  5/19/2025 11:45 am   INDICATION: M48.061 Spinal stenosis, lumbar region without neurogenic claudication G89.29 Other chronic pain M54.50 Low back pain, unspecified M54.17 Radiculopathy, lumbosacral region   COMPARISON: None.   ACCESSION NUMBER(S): [id number]   ORDERING CLINICIAN: OLIVA PAUL   TECHNIQUE: Sagittal STIR, sagittal T2, sagittal T1, axial T2, axial T1 weighted MRI images of the lumbar spine were obtained without intravenous contrast administration.   FINDINGS: The coronal  images demonstrate a mild levocurvature of the lower thoracic and lumbar spine.   There is 2 mm of retrolisthesis of L5 on S1.   There are degenerative signal changes noted along endplates throughout the lumbar and visualized lower thoracic region most pronounced at the L5/S1 level.   The visualized spinal cord demonstrates no signal abnormality within it.  The conus medullaris is normally positioned terminating at the L1 level.   There is a 14 mm perineural cyst/Tarlov cyst noted within the right aspect of the spinal canal at the S2 level.   There is diminished disc signal and loss of disc height at  the L5/S1 level compatible with degenerative disc disease.   At the L5/S1 level,  there is 2 mm of retrolisthesis of L5 on S1, posterior osteophytic spurring and posterior disc bulge along with degenerative facet changes contributing to mild overall narrowing of the thecal sac within the spinal canal. There is moderate to severe neural foraminal narrowing right greater than left.   At the L4/L5 level,  there are degenerative facet changes and mild ligamentum flavum hypertrophy along with a minimal posterior disc bulge contributing to mild spinal canal narrowing. There is no significant neural foraminal narrowing.   At the L3/L4 level,  there is a mild posterior disc bulge along with degenerative facet changes and ligamentum flavum hypertrophy. There is mild prominence of the epidural fat posteriorly within the spinal canal. There is moderate narrowing of the thecal sac in the AP dimension within the spinal canal. There is mild encroachment upon the neural foramen bilaterally left greater than right.   At the L2/L3 level,  there is a minimal posterior disc bulge along with mild degenerative facet changes and ligamentum flavum hypertrophy. There is mild prominence of the epidural fat posteriorly in the spinal canal. The combination of findings contributes to mild narrowing of the thecal sac in the AP dimension within the spinal canal.   At the L1/L2 level,  there is mild posterior osteophytic spurring and posterior disc bulge slightly more pronounced to the right of midline along with degenerative facet changes contributing to mild encroachment upon the spinal canal. There is no significant neural foraminal narrowing.   At the T12/L1 level,  there are mild degenerative facet changes without significant spinal canal or neural foraminal narrowing.   At the T11/12 level, the sagittal images demonstrate a minimal posterior disc without significant spinal canal or neural foraminal narrowing.   At the T10/11 level, the  sagittal images demonstrate a mild posterior disc/osteophyte complex and degenerative facet changes contributing to mild encroachment upon the ventral and dorsolateral spinal canal. No axial images were obtained through this level limiting further evaluation.   There is a 7 mm cystic focus noted along the lower pole of the right kidney.       The coronal  images demonstrate a mild levocurvature of the lower thoracic and lumbar spine.   There is 2 mm of retrolisthesis of L5 on S1.   There is multilevel spondylosis with varying degrees of spinal canal and neural foraminal narrowing as described above.   There is a 7 mm cystic focus noted along the lower pole of the right kidney.   MACRO: None.   Signed by: Ariel Ojeda 5/22/2025 9:00 AM Dictation workstation:   [id number]        Lili was seen today for follow-up.  Diagnoses and all orders for this visit:  Carpal tunnel syndrome of right wrist (Primary)  Numbness of right hand  Primary osteoarthritis of right hand  Lumbar back pain  Osteoarthritis of lumbosacral spine       Options are discussed with the patient in detail. The patient is instructed to wear wrist splints at night.  She states that she has not been doing this.  However she states that this right hand numbness tingling and pain are disabling and she requests a discussion of further options.  Right carpal tunnel releases discussed and the patient wishes to think this over before coming to a decision.  Return in 8 weeks after the patient has been wearing splints at night for at least 6 weeks for further discussion or sooner as needed. Please note that this report has been produced using speech recognition software.  It may contain errors related to grammar, punctuation or spelling.  Electronically signed, but not reviewed.     Thong Crouch MD

## 2025-05-29 ENCOUNTER — OFFICE VISIT (OUTPATIENT)
Dept: ORTHOPEDIC SURGERY | Facility: CLINIC | Age: 60
End: 2025-05-29
Payer: MEDICARE

## 2025-05-29 VITALS — WEIGHT: 250 LBS | HEIGHT: 71 IN | BODY MASS INDEX: 35 KG/M2

## 2025-05-29 DIAGNOSIS — M19.041 PRIMARY OSTEOARTHRITIS OF RIGHT HAND: ICD-10-CM

## 2025-05-29 DIAGNOSIS — R20.0 NUMBNESS OF RIGHT HAND: ICD-10-CM

## 2025-05-29 DIAGNOSIS — M54.50 LUMBAR BACK PAIN: ICD-10-CM

## 2025-05-29 DIAGNOSIS — M47.817 OSTEOARTHRITIS OF LUMBOSACRAL SPINE: ICD-10-CM

## 2025-05-29 DIAGNOSIS — G56.01 CARPAL TUNNEL SYNDROME OF RIGHT WRIST: Primary | ICD-10-CM

## 2025-05-29 PROCEDURE — RXMED WILLOW AMBULATORY MEDICATION CHARGE

## 2025-05-29 PROCEDURE — 3008F BODY MASS INDEX DOCD: CPT | Performed by: ORTHOPAEDIC SURGERY

## 2025-05-29 PROCEDURE — 99213 OFFICE O/P EST LOW 20 MIN: CPT | Performed by: ORTHOPAEDIC SURGERY

## 2025-05-29 ASSESSMENT — PAIN SCALES - GENERAL
PAINLEVEL_OUTOF10: 6
PAINLEVEL_OUTOF10: 6

## 2025-05-29 ASSESSMENT — ENCOUNTER SYMPTOMS
DEPRESSION: 0
OCCASIONAL FEELINGS OF UNSTEADINESS: 0
LOSS OF SENSATION IN FEET: 0

## 2025-05-29 ASSESSMENT — PAIN - FUNCTIONAL ASSESSMENT: PAIN_FUNCTIONAL_ASSESSMENT: 0-10

## 2025-05-29 ASSESSMENT — LIFESTYLE VARIABLES
HOW OFTEN DO YOU HAVE A DRINK CONTAINING ALCOHOL: MONTHLY OR LESS
HOW OFTEN DO YOU HAVE SIX OR MORE DRINKS ON ONE OCCASION: LESS THAN MONTHLY

## 2025-05-29 NOTE — PATIENT INSTRUCTIONS
Thank you for coming to see us today!       Get a right wrist splint M/L size from drug store, [facility] [employer]  Wear the splint for 2 months at night until you come for your follow up appointment     At the follow up appointment we will discuss if surgery will be scheduled

## 2025-05-30 ENCOUNTER — DOCUMENTATION (OUTPATIENT)
Dept: OCCUPATIONAL THERAPY | Facility: CLINIC | Age: 60
End: 2025-05-30
Payer: MEDICARE

## 2025-05-30 ENCOUNTER — APPOINTMENT (OUTPATIENT)
Dept: OCCUPATIONAL THERAPY | Facility: CLINIC | Age: 60
End: 2025-05-30
Payer: MEDICARE

## 2025-05-30 PROCEDURE — RXMED WILLOW AMBULATORY MEDICATION CHARGE

## 2025-05-30 NOTE — PROGRESS NOTES
Occupational Therapy                 Therapy Communication Note    Patient Name: Lili Doe  MRN: [id number]  Department:   Room: Room/bed info not found  Today's Date: 5/30/2025     Discipline: Occupational Therapy    Missed Visit Reason:  pt is a no show/no call . Writer called and left voicemail that there are no more scheduled visits. Pt advised to call in to re-schedule or notify us if she wants discharged.     Missed Time: No Show

## 2025-06-02 ENCOUNTER — EVALUATION (OUTPATIENT)
Dept: PHYSICAL THERAPY | Facility: CLINIC | Age: 60
End: 2025-06-02
Payer: MEDICARE

## 2025-06-02 DIAGNOSIS — M54.50 LUMBAR BACK PAIN: ICD-10-CM

## 2025-06-02 DIAGNOSIS — M47.817 OSTEOARTHRITIS OF LUMBOSACRAL SPINE: ICD-10-CM

## 2025-06-02 PROCEDURE — 97110 THERAPEUTIC EXERCISES: CPT | Mod: GP | Performed by: PHYSICAL THERAPIST

## 2025-06-02 PROCEDURE — 97140 MANUAL THERAPY 1/> REGIONS: CPT | Mod: GP | Performed by: PHYSICAL THERAPIST

## 2025-06-02 PROCEDURE — 97161 PT EVAL LOW COMPLEX 20 MIN: CPT | Mod: GP | Performed by: PHYSICAL THERAPIST

## 2025-06-02 ASSESSMENT — PATIENT HEALTH QUESTIONNAIRE - PHQ9
SUM OF ALL RESPONSES TO PHQ9 QUESTIONS 1 AND 2: 2
1. LITTLE INTEREST OR PLEASURE IN DOING THINGS: SEVERAL DAYS
10. IF YOU CHECKED OFF ANY PROBLEMS, HOW DIFFICULT HAVE THESE PROBLEMS MADE IT FOR YOU TO DO YOUR WORK, TAKE CARE OF THINGS AT HOME, OR GET ALONG WITH OTHER PEOPLE: SOMEWHAT DIFFICULT
2. FEELING DOWN, DEPRESSED OR HOPELESS: SEVERAL DAYS

## 2025-06-02 NOTE — PROGRESS NOTES
Physical Therapy  Physical Therapy Orthopedic Evaluation    Patient Name: Lili Doe  MRN: [id number]  Today's Date: 6/2/2025  Time Calculation  Start Time: 1031  Stop Time: 1115  Time Calculation (min): 44 min    PT Evaluation Time Entry  PT Evaluation (Low) Time Entry: 15  PT Therapeutic Procedures Time Entry  Manual Therapy Time Entry: 8  Therapeutic Exercise Time Entry: 15       Insurance:  Visit number: 1  Authorization info: No auth  Insurance Type: Payor: MEDICARE / Plan: MEDICARE PART A AND B / Product Type: *No Product type* /      Current Problem     Problem List Items Addressed This Visit           ICD-10-CM    Lumbar back pain M54.50    Relevant Orders    Follow Up In Physical Therapy    Osteoarthritis of lumbosacral spine M47.817    Relevant Orders    Follow Up In Physical Therapy       Surgery:    Referred by: Thong Crouch MD      Precautions:   Afib, type 2 DM    Subjective:   Subjective   Chief Complaint: Low back pain  Onset: chronic- 2010  YENI: insidious     General:   Low back pain is worse. Cannot turn side to side in bed. Notes she took the rest of the school year off, has to do PT for her right hand. Discussed adding PT for her back   Current Condition:   Same R side low back pain, constant     Pain:     Location: Low back and L leg  Rating: Best-5, Current-8, Worst-10  Description: Burning  Aggravating Factors:  moving around, bending, walking  Relieving Factors:  sitting, laying down, heating pad, tylenol     Relevant Information (PMH & Previous Tests/Imaging): x-rays of the hips including AP pelvis did show osteoarthritis at the lower lumbar spine.     MRI:  levocurvature of the lower thoracic and lumbar spine. There is 2 mm of retrolisthesis of L5 on S1. There is multilevel spondylosis with varying degrees of spinal canal and neural foraminal narrowing as described above.There is a 7 mm cystic focus noted along the lower pole of the right kidney.    Previous  Interventions/Treatments: Injections, Pt in the best    Prior Level of Function (PLOF)  Patient previously independent with all ADLs  Exercise/Physical Activity: Currently sedentary  Work/School: [employer] - [employer]    Patients Living Environment: Reviewed and no concern  Primary Language: English  Patient's Goal(s) for Therapy: Get better with pain relief    Red Flags: Do you have any of the following? No  Fever/chills, unexplained weight changes, dizziness/fainting, unexplained change in bowel or bladder functions, unexplained malaise or muscle weakness, night pain/sweats, numbness or tingling    Objective:  Objective     Palpation/Observation  TTP L5 paraspinals on the R, anterior R hip flexor  Posture  Mild scoliosis in standing,  R hip higher than L with noted R Le shorter than L in supine  Special Testing  + SLR, sacral rock  - ASIS compression, gap testing, hip scour  ROM  6/2/2025    Trunk ROM  AROM significant apprehension and guarding secondary to reported pain  Flexion 20 deg   Extension able to achieve upright posture  R and L side bending only 25% from starting position  Rotation 25% limitation with pain r>L  L hip WNL for flexion, IR/ER in neutral and 90deg, ABD/ADD without restriction  R hip[ flexion to 90 deg,  Long roll with pain 50% IR, ER WNL, Hip IR/Er at 90deg flexion 20 deg limited.  Normal hip joint mobility  Central PA glides lumbar spine no restriction, pain reproduced at L4 and L5    Strength  6/2/2025  Shoaib hip flexion 4/5, R>L knee extension 4+/5, PF/INV/EV Shoaib WNL  Can perform heel raise and calf raise standing at counter  Sensation  Denies Radic  Reflexes  Seated 1+ achilles and patellar, neg clonus shoaib    Transfers: slow, muscle guarded for sit to stand, supine to sit does require therapist assistance.  Gait: mild antalgia, no assistive device, slow speed, reciprocal pattern  SL Balance:   DL Squat: sit to stand, slow, mild antalgia,, utilizes shoaib UE assist   SL Squat:      Outcome  Measures:  Other Measures  Oswestry Disablity Index (MARBELLA): 25/50   6/2/2025  Treatments:  Ther-EX:  - Lower Trunk Rotations  - 1-2 x daily - 1 sets - 10 reps - 3 hold  - Hooklying Single Knee to Chest Stretch  - 1-2 x daily - 1 sets - 3 reps - 15-30 hold  - Supine Hamstring Stretch  - 1-2 x daily - 3 reps - 15-30 hold  - Supine Posterior Pelvic Tilt  - 1 x daily - 7 x weekly - 3 sets - 10 reps  - Supine Bridge with Mini Swiss Ball Between Knees  - 1-2 x daily - 1-2 sets - 10 reps - 3 hold    Manual:  R long axis distraction  Modalities:  Discussed ice and heat  Also discussed general walking program    PT Evaluation Time Entry  PT Evaluation (Low) Time Entry: 15  PT Therapeutic Procedures Time Entry  Manual Therapy Time Entry: 8  Therapeutic Exercise Time Entry: 15       EDUCATION: Home exercise program, plan of care, activity modifications, pain management, and injury pathology     Code: [id number]     Medical History Form: Reviewed (scanned into chart)  Reviewed medical form, [place], Falls risk, Roman Catholic beliefs, PHQ     Screening  Frequency  Date Last Completed   Spiritual and Cultural Beliefs   Screening each visit or episode of care 5/29/2025   Falls Risk Screening every ambulatory visit    Pain Screening annually at primary care visit  5/29/2025   Domestic Violence screening annually at primary care visit 5/29/2025   Depression Screening annually in the primary care setting 6/2/2025   Suicide Risk Screening annually in the primary care setting 5/29/2025   Nutrition and Food Insecurity   Screening at least annually at primary care visit     Key Learner annually in the primary care setting 5/29/2025   Drug Screen  4/24/2025  3:01 PM   Alcohol Screen  4/24/2025  3:00 PM   Advance Directive  4/24/2025     Time Calculation  Start Time: 1031  Stop Time: 1115  Time Calculation (min): 44 min  PT Evaluation Time Entry  PT Evaluation (Low) Time Entry: 15 PT Therapeutic Procedures Time Entry  Manual Therapy Time  Entry: 8  Therapeutic Exercise Time Entry: 15          Assessment: Patient presents with signs and symptoms consistent with Lumbar DDD with multiple bulging disc, tarlov cyst is present, resulting in limited participation in pain-free ADLs and inability to perform at their prior level of function. Pt would benefit from physical therapy to address the impairments found & listed previously in the objective section in order to return to safe and pain-free ADLs and prior level of function.     Complexity:Low  Prognosis: fair  Response to care: fair  Clinical Presentation: Stable and/or uncomplicated characteristics  Personal Factors: Comorbidities    Problem List:  Pain  Function  Mobility  Activity tolerance  Plan:     Planned Interventions include: therapeutic exercise, self-care home management, manual therapy, therapeutic activities, gait training, neuromuscular coordination, vasopneumatic, dry needling, aquatic therapy    Next Treatment:progress neutral spine, modalities as needed   Frequency: 1 x Week  Duration: 8 Weeks  Goals: Set and discussed today  4 weeks    Patient to have pain less than 5/10 for improved Lumbar flexion to 50 deg  Patient to be independent with HEP and progression for improved carryover  Improved ability to perform walking 500' standard mechanics without assistive device  Improved tolerance with transfer single UE assist for sit to stand     8 weeks  Patient to have improved MARBELLA score for improved function at home  Patient to have pain less than 3/10 for improved ADL performance  Improved pain less than 4/10 for better function with exercise to 30 min for improved health and fitness  ROM lumbar flexion to90 improved to for participation in  picking up light objects from the floor    Plan of care was developed with input and agreement by the patient      Stephen Lenz, PT

## 2025-06-09 ENCOUNTER — PHARMACY VISIT (OUTPATIENT)
Dept: PHARMACY | Facility: CLINIC | Age: 60
End: 2025-06-09
Payer: COMMERCIAL

## 2025-06-18 PROCEDURE — RXMED WILLOW AMBULATORY MEDICATION CHARGE

## 2025-06-19 PROCEDURE — RXMED WILLOW AMBULATORY MEDICATION CHARGE

## 2025-06-25 ENCOUNTER — APPOINTMENT (OUTPATIENT)
Dept: ORTHOPEDIC SURGERY | Facility: CLINIC | Age: 60
End: 2025-06-25
Payer: MEDICARE

## 2025-06-26 PROCEDURE — RXMED WILLOW AMBULATORY MEDICATION CHARGE

## 2025-06-27 ENCOUNTER — APPOINTMENT (OUTPATIENT)
Dept: PHYSICAL THERAPY | Facility: CLINIC | Age: 60
End: 2025-06-27
Payer: MEDICARE

## 2025-07-01 ENCOUNTER — APPOINTMENT (OUTPATIENT)
Dept: PHYSICAL THERAPY | Facility: CLINIC | Age: 60
End: 2025-07-01
Payer: MEDICARE

## 2025-07-02 PROCEDURE — RXMED WILLOW AMBULATORY MEDICATION CHARGE

## 2025-07-07 ENCOUNTER — PHARMACY VISIT (OUTPATIENT)
Dept: PHARMACY | Facility: CLINIC | Age: 60
End: 2025-07-07
Payer: COMMERCIAL

## 2025-07-10 PROCEDURE — RXMED WILLOW AMBULATORY MEDICATION CHARGE

## 2025-07-14 ENCOUNTER — APPOINTMENT (OUTPATIENT)
Dept: OPHTHALMOLOGY | Facility: CLINIC | Age: 60
End: 2025-07-14
Payer: MEDICAID

## 2025-07-14 DIAGNOSIS — H04.123 DRY EYE SYNDROME OF BOTH EYES: ICD-10-CM

## 2025-07-14 DIAGNOSIS — H52.7 REFRACTION ERROR: ICD-10-CM

## 2025-07-14 DIAGNOSIS — H25.13 AGE-RELATED NUCLEAR CATARACT OF BOTH EYES: Primary | ICD-10-CM

## 2025-07-14 DIAGNOSIS — H35.033 HYPERTENSIVE RETINOPATHY OF BOTH EYES: ICD-10-CM

## 2025-07-14 PROCEDURE — 99214 OFFICE O/P EST MOD 30 MIN: CPT | Performed by: OPHTHALMOLOGY

## 2025-07-14 ASSESSMENT — REFRACTION_MANIFEST
OS_CYLINDER: -0.50
OD_ADD: +2.50
OS_AXIS: 090
OS_AXIS: 085
OD_CYLINDER: -0.50
OS_SPHERE: +0.75
OS_SPHERE: +0.75
OD_CYLINDER: -0.50
OS_CYLINDER: -0.50
OD_AXIS: 080
OD_SPHERE: +1.00
OS_ADD: +2.50
OD_SPHERE: +0.75
OD_AXIS: 080
METHOD_AUTOREFRACTION: 1

## 2025-07-14 ASSESSMENT — PATIENT HEALTH QUESTIONNAIRE - PHQ9
SUM OF ALL RESPONSES TO PHQ9 QUESTIONS 1 AND 2: 0
1. LITTLE INTEREST OR PLEASURE IN DOING THINGS: NOT AT ALL
2. FEELING DOWN, DEPRESSED OR HOPELESS: NOT AT ALL

## 2025-07-14 ASSESSMENT — ENCOUNTER SYMPTOMS
NEUROLOGICAL NEGATIVE: 0
CARDIOVASCULAR NEGATIVE: 0
EYES NEGATIVE: 0
ENDOCRINE NEGATIVE: 0
GASTROINTESTINAL NEGATIVE: 0
CONSTITUTIONAL NEGATIVE: 0
ALLERGIC/IMMUNOLOGIC NEGATIVE: 0
MUSCULOSKELETAL NEGATIVE: 0
HEMATOLOGIC/LYMPHATIC NEGATIVE: 0
RESPIRATORY NEGATIVE: 0
PSYCHIATRIC NEGATIVE: 0

## 2025-07-14 ASSESSMENT — KERATOMETRY
OD_K1POWER_DIOPTERS: 44.25
OD_AXISANGLE2_DEGREES: 180
OS_AXISANGLE2_DEGREES: 180
OD_K2POWER_DIOPTERS: 44.50
OS_AXISANGLE_DEGREES: 90
OS_K2POWER_DIOPTERS: 44.75
OS_K1POWER_DIOPTERS: 44.75
OD_AXISANGLE_DEGREES: 90

## 2025-07-14 ASSESSMENT — VISUAL ACUITY
OD_SC: 20/20
OS_SC+: +1
CORRECTION_TYPE: GLASSES
METHOD: SNELLEN - SINGLE
OS_SC: 20/25

## 2025-07-14 ASSESSMENT — PAIN SCALES - GENERAL: PAINLEVEL_OUTOF10: 0-NO PAIN

## 2025-07-14 ASSESSMENT — SLIT LAMP EXAM - LIDS
COMMENTS: NORMAL
COMMENTS: NORMAL

## 2025-07-14 ASSESSMENT — EXTERNAL EXAM - LEFT EYE: OS_EXAM: NORMAL

## 2025-07-14 ASSESSMENT — TONOMETRY
OS_IOP_MMHG: 20
IOP_METHOD: GOLDMANN APPLANATION
OD_IOP_MMHG: 20

## 2025-07-14 ASSESSMENT — CUP TO DISC RATIO
OD_RATIO: 0.3
OS_RATIO: 0.3

## 2025-07-14 ASSESSMENT — EXTERNAL EXAM - RIGHT EYE: OD_EXAM: NORMAL

## 2025-07-14 NOTE — PATIENT INSTRUCTIONS
Thank you so much for choosing me to provide your care today!    If you were dilated your vision may remain blurry   or light sensitive for several hours.    The nature of eye and vision problems can require frequent follow up, please make every effort to adhere to any future appointments.    If you have any issues, questions, or concerns,   please do not hesitate to reach out.    If you receive a survey in regards to your care today, please mention any exceptional care my office staff and/or technicians provided.    You can reach our office at this number:    [phone]    Please consider signing up for and utilizing [employer]!  This is the best way to directly reach me or other  providers

## 2025-07-14 NOTE — PROGRESS NOTES
Assessment/Plan   Problem List Items Addressed This Visit       Dry eye syndrome    Advised on role of regular lubrication for best ocular comfort and vision.            Age-related nuclear cataract of both eyes - Primary    Non significant cataract noted on exam. Discussed the natural course of cataract and may require surgery at some point in the future. Will plan to continue to monitor with serial exam.            Hypertensive retinopathy of both eyes    Stable vasculature changes consistent with HTN history. Advised to continue best BP control.          Refraction error    Refraction performed today for diagnostic purposes only and without specific intent to dispense Rx. Glasses/SCL Rx not dispensed today.               Provided reassurance regarding above diagnoses and care received in the office visit today. Discussed outcomes and options along with the importance of treatment compliance. Understands the importance of any follow up visits. Patient instructed to call/communicate with our office if any new issues, questions, or concerns.     Will plan to see back in 1 year full or sooner PRN

## 2025-07-15 ENCOUNTER — TREATMENT (OUTPATIENT)
Dept: PHYSICAL THERAPY | Facility: CLINIC | Age: 60
End: 2025-07-15
Payer: MEDICARE

## 2025-07-15 DIAGNOSIS — M47.817 OSTEOARTHRITIS OF LUMBOSACRAL SPINE: ICD-10-CM

## 2025-07-15 DIAGNOSIS — M54.50 LUMBAR BACK PAIN: ICD-10-CM

## 2025-07-15 PROCEDURE — 97140 MANUAL THERAPY 1/> REGIONS: CPT | Mod: GP | Performed by: PHYSICAL THERAPIST

## 2025-07-15 PROCEDURE — 97110 THERAPEUTIC EXERCISES: CPT | Mod: GP | Performed by: PHYSICAL THERAPIST

## 2025-07-15 NOTE — PROGRESS NOTES
Physical Therapy  Physical Therapy Orthopedic Evaluation    Patient Name: Lili Doe  MRN: [id number]  Today's Date: 7/15/2025  Time Calculation  Start Time: 1200  Stop Time: 1245  Time Calculation (min): 45 min       PT Therapeutic Procedures Time Entry  Therapeutic Exercise Time Entry: 25  Manual Therapy Time Entry: 20       Insurance:  Visit number: 2  Authorization info: No auth  Insurance Type: Payor: MEDICARE / Plan: MEDICARE PART A AND B / Product Type: *No Product type* /      Current Problem     Problem List Items Addressed This Visit           ICD-10-CM    Lumbar back pain M54.50    Osteoarthritis of lumbosacral spine M47.817       Surgery:    Referred by: Thong Crouch MD      Precautions:   Afib, type 2 DM    Subjective:   Subjective  Back have been feeling much better with less pain while doing activities.     General:  Chief Complaint: Low back pain  Onset: chronic- 2010  YENI: insidious     Pain:     Location: Low back and L leg, mid back 4/10      Relevant Information (PMH & Previous Tests/Imaging): x-rays of the hips including AP pelvis did show osteoarthritis at the lower lumbar spine.     MRI:  levocurvature of the lower thoracic and lumbar spine. There is 2 mm of retrolisthesis of L5 on S1. There is multilevel spondylosis with varying degrees of spinal canal and neural foraminal narrowing as described above.There is a 7 mm cystic focus noted along the lower pole of the right kidney.    Previous Interventions/Treatments: Injections, Pt in the best    Prior Level of Function (PLOF)  Patient previously independent with all ADLs  Exercise/Physical Activity: Currently sedentary  Work/School: [employer]    Patients Living Environment: Reviewed and no concern  Primary Language: English  Patient's Goal(s) for Therapy: Get better with pain relief    Red Flags: Do you have any of the following? No  Fever/chills, unexplained weight changes, dizziness/fainting, unexplained change in bowel  [facility]    GASTROENTEROLOGY CONSULT    Patient:   Linn Hodgkin   :    1985   Facility:   [facility]   Date:    2020  Admission Dx:  Heroin overdose, accidental or unintentional, initial encounter [facility]) [place]  Requesting physician: Jocy Mast MD  Reason for consult:  Hemoccult positive gastric content       SUBJECTIVE     HISTORY OF PRESENT ILLNESS  This is a 30 y.o.   male who was admitted 2020 with Heroin overdose, accidental or unintentional, initial encounter ([facility] [address] [place]. We have been asked to see the patient in consultation by Jocy Mast MD for Hemoccult positive gastric content    Information obtained from the chart as patient is currently intubated and sedated. 31 y/o male with hx of HTN, seizure disorder and IVDU who presented to hospital after presumed unintentional overdose. Patient was reported to have snorted heroin around 1900 on 2020. By midday on 2020 a family member suggested EMS be called. Per emergency room MD account, upon EMS arrival, [facility] personnel found patient with agonal breathing, pin point pupils and vomited on his face and chest, possibly coffee ground. Patient received Narcan but no mental response. Patient was subsequently brought to the ED where he was unresponsive to pain stimuli and not protecting his airway. Initial venous blood gas showed severe respiratory acidosis. He was emergently intubated. Initial concern was for anoxic brain injury with negative CT of the head. Electrolytes were significantly deranged with potassium 9.1, BUN 38, creatinine 3.58. CBC revealed WBCs 22.1, hemoglobin 16.9, hematocrit 56.1 platelets 679. INR 1.1. LFTs showed alkaline phos 71, , , total bilirubin 0.29 with ammonia 108. Myoglobin 2000, CK 2,692. Patient was started on PPI drip. Patient seen and examined at bedside.   Patient intubated and on or bladder functions, unexplained malaise or muscle weakness, night pain/sweats, numbness or tingling    Objective:  Objective     Palpation/Observation  TTP L5 paraspinals on the R, anterior R hip flexor  Posture  Mild scoliosis in standing,  R hip higher than L with noted R Le shorter than L in supine  Special Testing  + SLR, sacral rock  - ASIS compression, gap testing, hip scour  ROM  7/15/2025    Trunk ROM  AROM significant apprehension and guarding secondary to reported pain  Flexion 20 deg   Extension able to achieve upright posture  R and L side bending only 25% from starting position  Rotation 25% limitation with pain r>L  L hip WNL for flexion, IR/ER in neutral and 90deg, ABD/ADD without restriction  R hip[ flexion to 90 deg,  Long roll with pain 50% IR, ER WNL, Hip IR/Er at 90deg flexion 20 deg limited.  Normal hip joint mobility  Central PA glides lumbar spine no restriction, pain reproduced at L4 and L5    Strength  7/15/2025  Shoaib hip flexion 4/5, R>L knee extension 4+/5, PF/INV/EV Shoaib WNL  Can perform heel raise and calf raise standing at counter  Sensation  Denies Radic  Reflexes  Seated 1+ achilles and patellar, neg clonus shoaib    Transfers: slow, muscle guarded for sit to stand, supine to sit does require therapist assistance.  Gait: mild antalgia, no assistive device, slow speed, reciprocal pattern  DL Squat: sit to stand, slow, mild antalgia,, utilizes shoaib UE assist     Treatments:  Ther-EX:  - Lower Trunk Rotations  2 x 10 reps - 3 hold  - Knee to Chest Stretch  2 x 3 reps - 15-30 hold  - Supine Bridge 2 x 12- 3 hold   - Supine Bridge with Mini Swiss Ball Between Knees  2 x 10 reps - 3 hold  - palloff 2 x 10 with YTB     Manual:  STM paraspinal muscle of L3 to L5   Modalities:  Discussed ice and heat  Also discussed general walking program       PT Therapeutic Procedures Time Entry  Therapeutic Exercise Time Entry: 25  Manual Therapy Time Entry: 20       EDUCATION: Home exercise program, plan of  propofol for sedation. Staff reports patient was tachypneic breathing over the vent and had some spontaneous extremity movement. Was not following commands. Chest x-ray today shows persistent left basilar opacity possible pneumonia or aspiration. MRI of the brain showed bilateral watershed territory acute infarcts consistent with hypotensive cerebral infarcts. Today's labs showing significant improvement in electrolytes without need for dialysis. Creatinine down to 1.50. OG tube with tan-colored gastric content. OBJECTIVE:     PAST MEDICAL/SURGICAL HISTORY  Past Medical History:   Diagnosis Date    Allergic rhinitis     Carpal tunnel syndrome of left wrist 3/13/2014    Fracture of rib of right side 12/16/2015    Fractured sternum 12/16/2015    HTN (hypertension) 10/17/2013    Seizure ([facility] [address] 7/26/2012    Seizure disorder ([facility] [address]     Substance abuse ([facility]     cocaine / heroin     History reviewed. No pertinent surgical history. ALLERGIES:  Allergies   Allergen Reactions    Depakote [Valproic Acid]     Seroquel [Quetiapine Fumarate]      Causes seizure    Zoloft Other (See Comments)     seizure       HOME MEDICATIONS:  Prior to Admission medications    Medication Sig Start Date End Date Taking?  Authorizing Provider   phenytoin (DILANTIN) 100 MG ER capsule take 2 capsules by mouth every morning then 3 every evening 5/30/19   Ebony Resendiz, APRN - CNP       CURRENT MEDICATIONS:  Scheduled Meds:   levetiracetam  500 mg Intravenous Q12H    piperacillin-tazobactam  3.375 g Intravenous Q8H    sodium chloride flush  10 mL Intravenous 2 times per day    vancomycin (VANCOCIN) intermittent dosing (placeholder)   Other RX Placeholder    calcium gluconate  1 g Intravenous Once     Continuous Infusions:   lactated ringers      pantoprozole (PROTONIX) infusion 8 mg/hr (02/23/20 0347)    propofol Stopped (02/22/20 2018)    sodium chloride 10 mL/hr at 02/22/20 2137     PRN Meds:fentanNYL, sodium care, activity modifications, pain management, and injury pathology     Code: [id number]     Medical History Form: Reviewed (scanned into chart)  Reviewed medical form, [place], Falls risk, Christianity beliefs, PHQ  Annually for patients age 55 and older and patients with life-threatening illness and more frequently at the discretion of the provider when there has been a change in patient condition/clinical indication.   Screening  Date Last Completed   Advance Directives 7/14/2025     Annually during a primary care office visit and at the discretion of the provider when there has been a change in patient condition/clinical indication.  Depression Screening 7/14/2025   Suicide Risk Screening 5/29/2025     Annually during any provider office visit and at the discretion of the provider when there has been a change in patient condition/clinical indication.  Alcohol Screening 7/14/2025 10:21 AM   Substance Use Screening 7/14/2025 10:21 AM    Tobacco 7/14/2025 11:12 AM     Completed at the discretion of the provider during any provider office visit or when there has been a change in patient condition/clinical indication.  Domestic Violence 7/14/2025   Human Trafficking    Spiritual/Cultural  7/14/2025   Food Insecurity Screening Social Determinants of Health     Patient Education/Key Learner 7/14/2025   Pain Screening 7/14/2025     Completed per Adult Falls Prevention (Outpatient), [id number]  Falls Risk Screening            Time Calculation  Start Time: 1200  Stop Time: 1245  Time Calculation (min): 45 min    PT Therapeutic Procedures Time Entry  Therapeutic Exercise Time Entry: 25  Manual Therapy Time Entry: 20          Assessment: Patient presents with signs and symptoms consistent with Lumbar DDD with multiple bulging disc, tarlov cyst is present, resulting in limited participation in pain-free ADLs and inability to perform at their prior level of function. Pt would benefit from physical therapy to address the  02/23/20  0421   ALKPHOS 71 [phone] 53   * 675* 994*   * 972* 946*   PROT 7.7 6.5 6.7   BILITOT 0.29* 0.65 0.51   LABALBU 4.3 3.7 3.7       AMYLASE/LIPASE/AMMONIA  Recent Labs     02/22/20  1637 02/22/20  2106   LIPASE  --  133*   AMMONIA 108*  --        PT/INR  Recent Labs     02/22/20  1637   PROTIME 11.3   INR 1.1     Acute Hepatitis Panel   Lab Results   Component Value Date    HEPBSAG NONREACTIVE 02/22/2020    HEPCAB REACTIVE 02/22/2020    HEPBIGM NONREACTIVE 02/22/2020    HEPAIGM NONREACTIVE 02/22/2020       HCV Genotype   No results found for: HEPATITISCGENOTYPE    HCV Quantitative   No results found for: HCVQNT    LIVER WORK UP    ACETAMINOPHEN   Lab Results   Component Value Date    ACTMNPHEN <5 02/22/2020       AFP  No results found for: AFP    Alpha 1 antitrypsin   No results found for: A1A    Anti - Liver/Kidney Ab  No results found for: LIVER-KIDNEYMICROSOMALAB    FELICITA  No results found for: FELICITA    AMA  No results found for: MITOAB    ASMA  No results found for: SMOOTHMUSCAB    Ceruloplasmin  No results found for: CERULOPLSM    Celiac panel  No results found for: TISSTRNTIIGG, TTGIGA, IGA    IgG  No results found for: IGG    IgM  No results found for: IGM    GGT   No results found for: LABGGT    CPK  Lab Results   Component Value Date    CKTOTAL 2,692 02/22/2020    CKTOTAL 76 06/19/2016    CKTOTAL 147 08/07/2014       Myoglobin  Lab Results   Component Value Date    MYOGLOBIN 731 02/23/2020    MYOGLOBIN 2,000 02/22/2020    MYOGLOBIN <21 06/19/2016         ANEMIA STUDIES  No results for input(s): IRON, LABIRON, TIBC, UIBC, FERRITIN, YHFPAAJH21, FOLATE, OCCULTBLD in the last 72 hours.     IMAGING  Ct Head Wo Contrast    Result Date: 2/22/2020  EXAMINATION: CT OF THE HEAD WITHOUT CONTRAST  2/22/2020 5:09 pm TECHNIQUE: CT of the head was performed without the administration of intravenous contrast. Dose modulation, iterative reconstruction, and/or weight based adjustment of the mA/kV was utilized to impairments found & listed previously in the objective section in order to return to safe and pain-free ADLs and prior level of function.The patient tolerated today’s therapy session well following a prolonged hiatus from treatment. They reported an overall improvement in symptoms since the last visit. The patient responded positively to the progression of therapeutic exercises and soft tissue. Focused on addressing pain and limitations with mobility at thoracolumbar junction     Complexity:Low  Prognosis: fair  Response to care: fair  Clinical Presentation: Stable and/or uncomplicated characteristics  Personal Factors: Comorbidities    Problem List:  Pain  Function  Mobility  Activity tolerance  Plan:     Planned Interventions include: therapeutic exercise, self-care home management, manual therapy, therapeutic activities, gait training, neuromuscular coordination, vasopneumatic, dry needling, aquatic therapy    Next Treatment:progress neutral spine, core strengthening, thoracic rom   Frequency: 1 x Week  Duration: 8 Weeks  Goals: Set and discussed today  4 weeks    Patient to have pain less than 5/10 for improved Lumbar flexion to 50 deg  Patient to be independent with HEP and progression for improved carryover  Improved ability to perform walking 500' standard mechanics without assistive device  Improved tolerance with transfer single UE assist for sit to stand     8 weeks  Patient to have improved MARBELLA score for improved function at home  Patient to have pain less than 3/10 for improved ADL performance  Improved pain less than 4/10 for better function with exercise to 30 min for improved health and fitness  ROM lumbar flexion to90 improved to for participation in  picking up light objects from the floor    Plan of care was developed with input and agreement by the patient      TAMMY VENTURA   significant degenerative changes. SOFT TISSUES: There is no prevertebral soft tissue swelling. Nasopharyngeal secretions. ETT and enteric tube Lungs: Biapical airspace possibly aspiration is. No acute abnormality of the cervical spine. Large volume nasopharyngeal secretions. Biapical airspace possibly aspiration edema. Ct Thoracic Spine Wo Contrast    Result Date: 2/23/2020  EXAMINATION: CT OF THE THORACIC SPINE WITHOUT CONTRAST; CT OF THE LUMBAR SPINE WITHOUT CONTRAST  2/22/2020 11:52 pm: TECHNIQUE: CT of the thoracic spine was performed without the administration of intravenous contrast. Multiplanar reformatted images are provided for review. Dose modulation, iterative reconstruction, and/or weight based adjustment of the mA/kV was utilized to reduce the radiation dose to as low as reasonably achievable.; CT of the lumbar spine was performed without the administration of intravenous contrast. Multiplanar reformatted images are provided for review. Dose modulation, iterative reconstruction, and/or weight based adjustment of the mA/kV was utilized to reduce the radiation dose to as low as reasonably achievable. COMPARISON: None. HISTORY: ORDERING SYSTEM PROVIDED HISTORY: hyper reflexive TECHNOLOGIST PROVIDED HISTORY: hyper refelxive Reason for Exam: recon off CAP Acuity: Acute FINDINGS: BONES/ALIGNMENT: There is normal alignment of the spine. The vertebral body heights are maintained. No osseous destructive lesion is seen. DEGENERATIVE CHANGES: No gross spinal canal stenosis or bony neural foraminal narrowing of the thoracic or lumbar spine. SOFT TISSUES: No paraspinal mass is seen. Left lung paramedian atelectatic changes. Unremarkable CT of the thoracic or lumbar spine.      Ct Lumbar Spine Wo Contrast    Result Date: 2/23/2020  EXAMINATION: CT OF THE THORACIC SPINE WITHOUT CONTRAST; CT OF THE LUMBAR SPINE WITHOUT CONTRAST  2/22/2020 11:52 pm: TECHNIQUE: CT of the thoracic spine was performed without the administration of intravenous contrast. Multiplanar reformatted images are provided for review. Dose modulation, iterative reconstruction, and/or weight based adjustment of the mA/kV was utilized to reduce the radiation dose to as low as reasonably achievable.; CT of the lumbar spine was performed without the administration of intravenous contrast. Multiplanar reformatted images are provided for review. Dose modulation, iterative reconstruction, and/or weight based adjustment of the mA/kV was utilized to reduce the radiation dose to as low as reasonably achievable. COMPARISON: None. HISTORY: ORDERING SYSTEM PROVIDED HISTORY: hyper reflexive TECHNOLOGIST PROVIDED HISTORY: hyper refelxive Reason for Exam: recon off CAP Acuity: Acute FINDINGS: BONES/ALIGNMENT: There is normal alignment of the spine. The vertebral body heights are maintained. No osseous destructive lesion is seen. DEGENERATIVE CHANGES: No gross spinal canal stenosis or bony neural foraminal narrowing of the thoracic or lumbar spine. SOFT TISSUES: No paraspinal mass is seen. Left lung paramedian atelectatic changes. Unremarkable CT of the thoracic or lumbar spine. Ct Chest Abdomen Pelvis W Contrast    Result Date: 2/22/2020  EXAMINATION: CT OF THE CHEST, ABDOMEN, AND PELVIS WITH CONTRAST 2/22/2020 5:08 pm TECHNIQUE: CT of the chest, abdomen and pelvis was performed with the administration of intravenous contrast. Multiplanar reformatted images are provided for review. Dose modulation, iterative reconstruction, and/or weight based adjustment of the mA/kV was utilized to reduce the radiation dose to as low as reasonably achievable.  COMPARISON: CT abdomen and pelvis December 20, 2015 HISTORY: ORDERING SYSTEM PROVIDED HISTORY: unresponsive, ams adri kup TECHNOLOGIST PROVIDED HISTORY: unresponsive, ams adri mcclain Reason for Exam: ams Acuity: Unknown Type of Exam: Unknown FINDINGS: Chest: Mediastinum: ET and enteric tubes appear in appropriate position. Heart and great vessels appear normal.  No pathologic mediastinal or hilar lymphadenopathy. Lungs/pleura: Left lower lobe airspace disease. Nodular and ground-glass densities bilaterally. Soft Tissues/Bones: Normal Abdomen/Pelvis: Organs: The abdominal wall appears normal. The liver, spleen, pancreas, and adrenals appear normal.  Gallbladder normal. Punctate nonobstructing nephrolith on the right. Left kidney normal. Sewell catheter is in the bladder. Central venous line right groin terminating in the right common femoral vein. GI/Bowel: The stomach,small bowel, and colon appear normal. Enteric tube is in the stomach. Appendix normal. Pelvis: Normal Peritoneum/Retroperitoneum: Retroaortic left renal vein. The abdominal aorta and iliac arteries are normal in caliber. There is no pathologic adenopathy. Bones/Soft Tissues: Normal     No acute disease. Incidental findings as above. IMPRESSION:     29 y/o male with hx of HTN, seizure disorder and IVDU who presented to hospital after presumed unintentional heroin overdose. Patient was reported to have snorted heroin around 1900 on 2/21/2020 and became unresponsive. EMS was not till about 20 hours later where EMS personnel found patient with agonal breathing, pin point pupils and vomited on his face and chest, possibly coffee ground. GI consulted from possible coffee ground emesis. GI Issues   1. Dark emesis -questionable CGE - Suspect dark emesis related to undigested food. Gfb stable. There may be a component of mild mucosal injury due to hypotension. PUD/esophagitis/gastritis all possible as underlying medical history prior to admission unknown. Currently no signs of active or ongoing GI bleeding. 2. Abnormal LFTs -secondary to shock liver as well as rhabdomyolysis. Patient is hep C Ab positive. Acute flare of Hep C less likely     3. Hep C Ab +      Old records, labs and imaging reviewed. PLAN   1.   Monitor hemoglobin and

## 2025-07-18 ENCOUNTER — PHARMACY VISIT (OUTPATIENT)
Dept: PHARMACY | Facility: CLINIC | Age: 60
End: 2025-07-18
Payer: COMMERCIAL

## 2025-07-22 ENCOUNTER — TREATMENT (OUTPATIENT)
Dept: PHYSICAL THERAPY | Facility: CLINIC | Age: 60
End: 2025-07-22
Payer: MEDICARE

## 2025-07-22 DIAGNOSIS — M54.50 LUMBAR BACK PAIN: ICD-10-CM

## 2025-07-22 DIAGNOSIS — M47.817 OSTEOARTHRITIS OF LUMBOSACRAL SPINE: ICD-10-CM

## 2025-07-22 PROCEDURE — 97140 MANUAL THERAPY 1/> REGIONS: CPT | Mod: GP | Performed by: PHYSICAL THERAPIST

## 2025-07-22 PROCEDURE — 97110 THERAPEUTIC EXERCISES: CPT | Mod: GP | Performed by: PHYSICAL THERAPIST

## 2025-07-22 NOTE — PROGRESS NOTES
Physical Therapy  Physical Therapy Orthopedic Evaluation    Patient Name: Lili Doe  MRN: [id number]  Today's Date: 7/22/2025  Time Calculation  Start Time: 1000  Stop Time: 1045  Time Calculation (min): 45 min       PT Therapeutic Procedures Time Entry  Therapeutic Exercise Time Entry: 25  Manual Therapy Time Entry: 20       Insurance:  Visit number: 3  Authorization info: No auth  Insurance Type: Payor: MEDICARE / Plan: MEDICARE PART A AND B / Product Type: *No Product type* /      Current Problem     Problem List Items Addressed This Visit           ICD-10-CM    Lumbar back pain M54.50    Osteoarthritis of lumbosacral spine M47.817       Surgery:    Referred by: Thong Crouch MD      Precautions:   Afib, type 2 DM    Subjective:   Subjective  Back have been feeling much better with less pain while doing activities.     General:  Chief Complaint: Low back pain  Onset: chronic- 2010  YENI: insidious     Pain:     Location: Low back and L leg, mid back 4/10      Relevant Information (PMH & Previous Tests/Imaging): x-rays of the hips including AP pelvis did show osteoarthritis at the lower lumbar spine.     MRI:  levocurvature of the lower thoracic and lumbar spine. There is 2 mm of retrolisthesis of L5 on S1. There is multilevel spondylosis with varying degrees of spinal canal and neural foraminal narrowing as described above.There is a 7 mm cystic focus noted along the lower pole of the right kidney.    Previous Interventions/Treatments: Injections, Pt in the best    Prior Level of Function (PLOF)  Patient previously independent with all ADLs  Exercise/Physical Activity: Currently sedentary  Work/School: [employer]    Patients Living Environment: Reviewed and no concern  Primary Language: English  Patient's Goal(s) for Therapy: Get better with pain relief    Red Flags: Do you have any of the following? No  Fever/chills, unexplained weight changes, dizziness/fainting, unexplained change in bowel  or bladder functions, unexplained malaise or muscle weakness, night pain/sweats, numbness or tingling    Objective:  Objective     Palpation/Observation  TTP L5 paraspinals on the R, anterior R hip flexor  Posture  Mild scoliosis in standing,  R hip higher than L with noted R Le shorter than L in supine  Special Testing  + SLR, sacral rock  - ASIS compression, gap testing, hip scour  ROM  7/22/2025    Trunk ROM  AROM significant apprehension and guarding secondary to reported pain  Flexion 20 deg   Extension able to achieve upright posture  R and L side bending only 25% from starting position  Rotation 25% limitation with pain r>L  L hip WNL for flexion, IR/ER in neutral and 90deg, ABD/ADD without restriction  R hip[ flexion to 90 deg,  Long roll with pain 50% IR, ER WNL, Hip IR/Er at 90deg flexion 20 deg limited.  Normal hip joint mobility  Central PA glides lumbar spine no restriction, pain reproduced at L4 and L5    Strength  7/22/2025  Shoaib hip flexion 4/5, R>L knee extension 4+/5, PF/INV/EV Shoaib WNL  Can perform heel raise and calf raise standing at counter  Sensation  Denies Radic  Reflexes  Seated 1+ achilles and patellar, neg clonus shoaib    Transfers: slow, muscle guarded for sit to stand, supine to sit does require therapist assistance.  Gait: mild antalgia, no assistive device, slow speed, reciprocal pattern  DL Squat: sit to stand, slow, mild antalgia,, utilizes shoaib UE assist     Treatments:  Ther-EX:  - back roll   - Lower Trunk Rotations  2 x 10 reps - 3 hold  - Knee to Chest Stretch  2 x 3 reps - 15-30 hold  - Supine Bridge 2 x 12- 3 hold   - Supine Bridge with Mini Swiss Ball Between Knees  2 x 10 reps - 3 hold  - palloff 2 x 10 with YTB     Manual:  STM paraspinal muscle of L3 to L5   Modalities:  Discussed ice and heat  Also discussed general walking program       PT Therapeutic Procedures Time Entry  Therapeutic Exercise Time Entry: 25  Manual Therapy Time Entry: 20       EDUCATION: Home exercise  program, plan of care, activity modifications, pain management, and injury pathology     Code: [id number]     Medical History Form: Reviewed (scanned into chart)  Reviewed medical form, [place], Falls risk, Restorationism beliefs, PHQ  Annually for patients age 55 and older and patients with life-threatening illness and more frequently at the discretion of the provider when there has been a change in patient condition/clinical indication.   Screening  Date Last Completed   Advance Directives 7/14/2025     Annually during a primary care office visit and at the discretion of the provider when there has been a change in patient condition/clinical indication.  Depression Screening 7/14/2025   Suicide Risk Screening 5/29/2025     Annually during any provider office visit and at the discretion of the provider when there has been a change in patient condition/clinical indication.  Alcohol Screening 7/14/2025 10:21 AM   Substance Use Screening 7/14/2025 10:21 AM    Tobacco 7/14/2025 11:12 AM     Completed at the discretion of the provider during any provider office visit or when there has been a change in patient condition/clinical indication.  Domestic Violence 7/14/2025   Human Trafficking    Spiritual/Cultural  7/14/2025   Food Insecurity Screening Social Determinants of Health     Patient Education/Key Learner 7/14/2025   Pain Screening 7/14/2025     Completed per Adult Falls Prevention (Outpatient), [id number]  Falls Risk Screening            Time Calculation  Start Time: 1000  Stop Time: 1045  Time Calculation (min): 45 min    PT Therapeutic Procedures Time Entry  Therapeutic Exercise Time Entry: 25  Manual Therapy Time Entry: 20          Assessment: Patient presents with signs and symptoms consistent with Lumbar DDD with multiple bulging disc, tarlov cyst is present, resulting in limited participation in pain-free ADLs and inability to perform at their prior level of function. Pt would benefit from physical therapy to  address the impairments found & listed previously in the objective section in order to return to safe and pain-free ADLs and prior level of function.The patient tolerated today’s therapy session well. Overall, symptoms remain unchanged since the last visit. The patient responded more positively to therapeutic exercises emphasizing lumbar extension rather than flexion, as well as soft tissue work. The session focused on addressing pain and mobility limitations at the thoracolumbar junction.     Complexity:Low  Prognosis: fair  Response to care: fair  Clinical Presentation: Stable and/or uncomplicated characteristics  Personal Factors: Comorbidities    Problem List:  Pain  Function  Mobility  Activity tolerance  Plan:     Planned Interventions include: therapeutic exercise, self-care home management, manual therapy, therapeutic activities, gait training, neuromuscular coordination, vasopneumatic, dry needling, aquatic therapy    Next Treatment:progress neutral spine, core strengthening, lumbar extension rom  Frequency: 1 x Week  Duration: 8 Weeks  Goals: Set and discussed today  4 weeks    Patient to have pain less than 5/10 for improved Lumbar flexion to 50 deg  Patient to be independent with HEP and progression for improved carryover  Improved ability to perform walking 500' standard mechanics without assistive device  Improved tolerance with transfer single UE assist for sit to stand     8 weeks  Patient to have improved MARBELLA score for improved function at home  Patient to have pain less than 3/10 for improved ADL performance  Improved pain less than 4/10 for better function with exercise to 30 min for improved health and fitness  ROM lumbar flexion to90 improved to for participation in  picking up light objects from the floor    Plan of care was developed with input and agreement by the patient      TAMMY VENTURA

## 2025-07-25 NOTE — PROGRESS NOTES
Subjective      Chief Complaint   Patient presents with    Right Hand - Follow-up        No surgery found     HPI  This 59 year old established patient presents for reevaluation and treatment of right hand pain, numbness and tingling. She states that her right hand, numbness and tingling started to bother her several months ago. She denies any recent injury or trauma to her right hand. She rates right hand pain at 8/10 and states it is worse with and aggravated by attempting to hold and release objects in her right hand.  She does get intermittent numbness in her right hand when she wakes up in the morning. She is right hand dominant. She has tried and failed OTC tylenol and NSAIDS as well as meloxicam. She has also tried prednisone with some relief of the right hand pain.  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     She has finished attending OT and is also performing her exercises as home as directed. She continues to complain of some right hand pain, numbness and tingling despite OT and bracing.       CARDIOLOGY:   Negative for chest pain, shortness of breath.   RESPIRATORY:   Negative for chest pain, shortness of breath.   MUSCULOSKELETAL:   See HPI for details.   NEUROLOGY:   Negative for tingling, numbness, weakness.    Objective    There were no vitals filed for this visit.    Physical Exam  GENERAL:          General Appearance:  This is a pleasant patient with appropriate affect, in no acute distress.   DERMATOLOGY:          Skin: skin at the neck, upper and lower back, and trunk is intact. There is no evidence of skin rash, skin breakdown or ulceration, or atrophic skin change.   EXTREMITIES:          Vascular:  Right, left hands and feet are warm with good color and pulses. Right and left calf and thigh are nontender and nonswollen.   NEUROLOGICAL:          Orientation:  Patient is alert  and oriented to person, place, time and situation. Right and left upper and lower extremity motor and sensory examinations are intact.      MUSCULOSKELETAL: Neck: Nontender. No pain with range of motion.  Left hand: Nontender no pain limitation with range of motion.  Right hand: There is mild pain with palpation over the volar aspect of the right hand and that the right thumb carpometacarpal joint.  There is no pain dorsally.  There is no evidence of trigger finger or catching locking of any of the right hand fingers.  Phalen's is positive on the right hand. Phalen's is negative on the left.  Finkelstein's is negative.  There is  pain with range of motion at the right wrist.  Compartments are soft.  No evidence of skin infection, abscess, gout or infectious process on examination today.  Brisk capillary refill.  Radial pulses readily palpable.  Back: There is diffuse tenderness at the lumbar spine.  Straight leg raising appears to be negative bilaterally.    AP and lateral x-rays of the right hand done in the office  show osteoarthritis at the metacarpal bones of the right hand.  Previous x-rays of the hips including AP pelvis did show osteoarthritis at the lower lumbar spine.  Reviewed the x-rays listed above with the patient in the office today.    XR chest 1 view    Result Date: 3/5/2025  Interpreted By:  Alexsandra Ward, STUDY: XR CHEST 1 VIEW;  3/5/2025 12:37 pm   INDICATION: Signs/Symptoms:dizziness.   COMPARISON: 01/21/2025   ACCESSION NUMBER(S): [id number]   ORDERING CLINICIAN: CROW REYES   FINDINGS: AP radiograph of the chest was provided.       CARDIOMEDIASTINAL SILHOUETTE: Cardiomediastinal silhouette is stable in size and configuration.   LUNGS: No consolidation, pulmonary edema, pleural effusion, or pneumothorax.   ABDOMEN: No remarkable upper abdominal findings.   BONES: No acute osseous changes.       No evidence of acute cardiopulmonary process.   Signed by: Alexsandra Ward 3/5/2025 12:41 PM  Dictation workstation:   [id number]    CT cervical spine wo IV contrast    Result Date: 3/5/2025  Interpreted By:  Nataliia Ham, STUDY: CT CERVICAL SPINE WO IV CONTRAST;  3/5/2025 12:03 pm   INDICATION: Signs/Symptoms:neck pain.   COMPARISON: Cervical spine CT 03/26/2024.   ACCESSION NUMBER(S): [id number]   ORDERING CLINICIAN: CROW REYES   TECHNIQUE: Axial CT images of the cervical spine are obtained. Axial, coronal and sagittal reconstructions are provided for review.   FINDINGS: No acute fracture or malalignment. Vertebral body heights are maintained. Atlantoaxial and atlantooccipital joint alignment is preserved. Facets are well aligned.   Multilevel degenerative changes are again seen and stable, including mild intervertebral disc space narrowing, anterior endplate osteophyte formation and uncovertebral joint hypertrophy from C2 through T1. No significant spinal canal narrowing. Diffuse bilateral facet joint hypertrophy is again seen and stable with mild right C3-C4, moderate bilateral C4-C5 and mild bilateral C5-C6 neural foraminal narrowing.   No prevertebral hematoma.       1. No acute bony abnormality of the cervical spine.   2. Stable multilevel degenerative disc disease and bilateral facet joint arthrosis with varying degrees of bilateral neural foraminal narrowing throughout the cervical spine, as detailed above.   Signed by: Nataliia Ham 3/5/2025 12:26 PM Dictation workstation:   [id number]    ECG 12 lead    Result Date: 3/5/2025  Normal sinus rhythm Normal ECG When compared with ECG of 21-JAN-2025 15:49, Criteria for Septal infarct are no longer Present Confirmed by Ricco Cortez ([id number]) on 3/5/2025 12:26:00 PM    CT head wo IV contrast    Result Date: 3/5/2025  Interpreted By:  Nataliia Ham, STUDY: CT HEAD WO IV CONTRAST;  3/5/2025 12:03 pm   INDICATION: Signs/Symptoms:lightheaded.   COMPARISON: CT head 08/31/2022.   ACCESSION NUMBER(S): [id number]   ORDERING CLINICIAN: CROW REYES    "TECHNIQUE: Noncontrast axial CT scan of head was performed. Multiplanar reconstructions   FINDINGS: No evidence of a large territorial infarct. Gray-white matter interfaces are preserved. No acute intracranial hemorrhage, mass effect, midline shift, or herniation. No evidence of hydrocephalus. The ventricles and sulci are unremarkable for age.   The pituitary appears thinned/flattened, measuring about 2.4 mm in thickness and the sella is filled with CSF, resulting in an \"empty sella\" appearance. This is similar compared with 2022.   Stable size and appearance of the 1.9 x 1.3 cm left sphenoid sinus mucous retention cyst versus polyp in the lateral aspect of the left sphenoid sinus. The remainder of the paranasal sinuses and mastoid air cells are clear. No acute osseous abnormality of the calvarium. No destructive bone lesion.       1. No CT evidence of acute intracranial abnormality. Consider follow-up with MRI as warranted.   2. Stable \"empty sella\" appearance and stable 1.9 cm left sphenoid sinus mucous retention cyst versus polyp compared with the 2022 exam.     Signed by: Nataliia Ham 3/5/2025 12:17 PM Dictation workstation:   [id number]     MR lumbar spine wo IV contrast  Result Date: 5/22/2025  Interpreted By:  Ariel Ojeda, STUDY: MR LUMBAR SPINE WO IV CONTRAST;  5/19/2025 11:45 am   INDICATION: M48.061 Spinal stenosis, lumbar region without neurogenic claudication G89.29 Other chronic pain M54.50 Low back pain, unspecified M54.17 Radiculopathy, lumbosacral region   COMPARISON: None.   ACCESSION NUMBER(S): [id number]   ORDERING CLINICIAN: OLIVA PAUL   TECHNIQUE: Sagittal STIR, sagittal T2, sagittal T1, axial T2, axial T1 weighted MRI images of the lumbar spine were obtained without intravenous contrast administration.   FINDINGS: The coronal  images demonstrate a mild levocurvature of the lower thoracic and lumbar spine.   There is 2 mm of retrolisthesis of L5 on S1.   There are degenerative " signal changes noted along endplates throughout the lumbar and visualized lower thoracic region most pronounced at the L5/S1 level.   The visualized spinal cord demonstrates no signal abnormality within it.  The conus medullaris is normally positioned terminating at the L1 level.   There is a 14 mm perineural cyst/Tarlov cyst noted within the right aspect of the spinal canal at the S2 level.   There is diminished disc signal and loss of disc height at the L5/S1 level compatible with degenerative disc disease.   At the L5/S1 level,  there is 2 mm of retrolisthesis of L5 on S1, posterior osteophytic spurring and posterior disc bulge along with degenerative facet changes contributing to mild overall narrowing of the thecal sac within the spinal canal. There is moderate to severe neural foraminal narrowing right greater than left.   At the L4/L5 level,  there are degenerative facet changes and mild ligamentum flavum hypertrophy along with a minimal posterior disc bulge contributing to mild spinal canal narrowing. There is no significant neural foraminal narrowing.   At the L3/L4 level,  there is a mild posterior disc bulge along with degenerative facet changes and ligamentum flavum hypertrophy. There is mild prominence of the epidural fat posteriorly within the spinal canal. There is moderate narrowing of the thecal sac in the AP dimension within the spinal canal. There is mild encroachment upon the neural foramen bilaterally left greater than right.   At the L2/L3 level,  there is a minimal posterior disc bulge along with mild degenerative facet changes and ligamentum flavum hypertrophy. There is mild prominence of the epidural fat posteriorly in the spinal canal. The combination of findings contributes to mild narrowing of the thecal sac in the AP dimension within the spinal canal.   At the L1/L2 level,  there is mild posterior osteophytic spurring and posterior disc bulge slightly more pronounced to the right of  midline along with degenerative facet changes contributing to mild encroachment upon the spinal canal. There is no significant neural foraminal narrowing.   At the T12/L1 level,  there are mild degenerative facet changes without significant spinal canal or neural foraminal narrowing.   At the T11/12 level, the sagittal images demonstrate a minimal posterior disc without significant spinal canal or neural foraminal narrowing.   At the T10/11 level, the sagittal images demonstrate a mild posterior disc/osteophyte complex and degenerative facet changes contributing to mild encroachment upon the ventral and dorsolateral spinal canal. No axial images were obtained through this level limiting further evaluation.   There is a 7 mm cystic focus noted along the lower pole of the right kidney.       The coronal  images demonstrate a mild levocurvature of the lower thoracic and lumbar spine.   There is 2 mm of retrolisthesis of L5 on S1.   There is multilevel spondylosis with varying degrees of spinal canal and neural foraminal narrowing as described above.   There is a 7 mm cystic focus noted along the lower pole of the right kidney.   MACRO: None.   Signed by: Ariel Ojeda 5/22/2025 9:00 AM Dictation workstation:   [id number]  EMG & nerve conduction   Listed below is reviewed with the patient in the office today.  Result Date: 5/22/2025  Impression: This is an abnormal study of the right upper extremity. There is evidence of mild right median neuropathy at the wrist.  The pathophysiology is demyelination.  These findings would be consistent with a clinical diagnosis of carpal tunnel syndrome. There is no definite evidence of right ulnar neuropathy, however there is a slightly low amplitude at the above elbow response which is likely technical. There is no evidence of cervical radiculopathy in the right upper extremity. Brennen Quigley MD  -------------------------------------------------------------------------------------------------------------------------------------------------------------------------------------------------------------------------------------------------------------- -------------------------------------------------------------------------------------------------------------------------------------------------------------------------------------------------------------------------------------------------------------- -----------     MR lumbar spine wo IV contrast  Result Date: 5/22/2025  Interpreted By:  Ariel Ojeda, STUDY: MR LUMBAR SPINE WO IV CONTRAST;  5/19/2025 11:45 am   INDICATION: M48.061 Spinal stenosis, lumbar region without neurogenic claudication G89.29 Other chronic pain M54.50 Low back pain, unspecified M54.17 Radiculopathy, lumbosacral region   COMPARISON: None.   ACCESSION NUMBER(S): [id number]   ORDERING CLINICIAN: OLIVA PAUL   TECHNIQUE: Sagittal STIR, sagittal T2, sagittal T1, axial T2, axial T1 weighted MRI images of the lumbar spine were obtained without intravenous contrast administration.   FINDINGS: The coronal  images demonstrate a mild levocurvature of the lower thoracic and lumbar spine.   There is 2 mm of retrolisthesis of L5 on S1.   There are degenerative signal changes noted along endplates throughout the lumbar and visualized lower thoracic region most pronounced at the L5/S1 level.   The visualized spinal cord demonstrates no signal abnormality within it.  The conus medullaris is normally positioned terminating at the L1 level.   There is a 14 mm perineural cyst/Tarlov cyst noted within the right aspect of the spinal canal at the S2 level.   There is diminished disc signal and loss of disc height at the L5/S1 level compatible with degenerative disc disease.   At the L5/S1 level,  there is 2 mm of retrolisthesis of L5 on S1, posterior osteophytic spurring and posterior disc bulge  along with degenerative facet changes contributing to mild overall narrowing of the thecal sac within the spinal canal. There is moderate to severe neural foraminal narrowing right greater than left.   At the L4/L5 level,  there are degenerative facet changes and mild ligamentum flavum hypertrophy along with a minimal posterior disc bulge contributing to mild spinal canal narrowing. There is no significant neural foraminal narrowing.   At the L3/L4 level,  there is a mild posterior disc bulge along with degenerative facet changes and ligamentum flavum hypertrophy. There is mild prominence of the epidural fat posteriorly within the spinal canal. There is moderate narrowing of the thecal sac in the AP dimension within the spinal canal. There is mild encroachment upon the neural foramen bilaterally left greater than right.   At the L2/L3 level,  there is a minimal posterior disc bulge along with mild degenerative facet changes and ligamentum flavum hypertrophy. There is mild prominence of the epidural fat posteriorly in the spinal canal. The combination of findings contributes to mild narrowing of the thecal sac in the AP dimension within the spinal canal.   At the L1/L2 level,  there is mild posterior osteophytic spurring and posterior disc bulge slightly more pronounced to the right of midline along with degenerative facet changes contributing to mild encroachment upon the spinal canal. There is no significant neural foraminal narrowing.   At the T12/L1 level,  there are mild degenerative facet changes without significant spinal canal or neural foraminal narrowing.   At the T11/12 level, the sagittal images demonstrate a minimal posterior disc without significant spinal canal or neural foraminal narrowing.   At the T10/11 level, the sagittal images demonstrate a mild posterior disc/osteophyte complex and degenerative facet changes contributing to mild encroachment upon the ventral and dorsolateral spinal canal. No  axial images were obtained through this level limiting further evaluation.   There is a 7 mm cystic focus noted along the lower pole of the right kidney.       The coronal  images demonstrate a mild levocurvature of the lower thoracic and lumbar spine.   There is 2 mm of retrolisthesis of L5 on S1.   There is multilevel spondylosis with varying degrees of spinal canal and neural foraminal narrowing as described above.   There is a 7 mm cystic focus noted along the lower pole of the right kidney.   MACRO: None.   Signed by: Ariel Ojeda 5/22/2025 9:00 AM Dictation workstation:   [id number]        Lili was seen today for follow-up.  Diagnoses and all orders for this visit:  Right hand pain (Primary)  Carpal tunnel syndrome of right wrist  Primary osteoarthritis of right hand         Options are discussed with the patient in detail. The patient is instructed to wear wrist splints at night.  She states that she has not been doing this.  However she states that this right hand numbness tingling and pain are disabling and she requests a discussion of further options.  Right carpal tunnel release is discussed and the patient wishes to think this over before coming to a decision.  Return as needed. Please note that this report has been produced using speech recognition software.  It may contain errors related to grammar, punctuation or spelling.  Electronically signed, but not reviewed.     Thong Crouch MD

## 2025-07-28 ENCOUNTER — APPOINTMENT (OUTPATIENT)
Dept: PHYSICAL THERAPY | Facility: CLINIC | Age: 60
End: 2025-07-28
Payer: MEDICARE

## 2025-07-29 ENCOUNTER — OFFICE VISIT (OUTPATIENT)
Dept: ORTHOPEDIC SURGERY | Facility: CLINIC | Age: 60
End: 2025-07-29
Payer: MEDICARE

## 2025-07-29 VITALS — WEIGHT: 250 LBS | HEIGHT: 71 IN | BODY MASS INDEX: 35 KG/M2

## 2025-07-29 DIAGNOSIS — G56.01 CARPAL TUNNEL SYNDROME OF RIGHT WRIST: ICD-10-CM

## 2025-07-29 DIAGNOSIS — M19.041 PRIMARY OSTEOARTHRITIS OF RIGHT HAND: ICD-10-CM

## 2025-07-29 DIAGNOSIS — M79.641 RIGHT HAND PAIN: Primary | ICD-10-CM

## 2025-07-29 PROCEDURE — 3008F BODY MASS INDEX DOCD: CPT | Performed by: ORTHOPAEDIC SURGERY

## 2025-07-29 PROCEDURE — 99213 OFFICE O/P EST LOW 20 MIN: CPT | Performed by: ORTHOPAEDIC SURGERY

## 2025-07-29 ASSESSMENT — COLUMBIA-SUICIDE SEVERITY RATING SCALE - C-SSRS
2. HAVE YOU ACTUALLY HAD ANY THOUGHTS OF KILLING YOURSELF?: NO
6. HAVE YOU EVER DONE ANYTHING, STARTED TO DO ANYTHING, OR PREPARED TO DO ANYTHING TO END YOUR LIFE?: NO
1. IN THE PAST MONTH, HAVE YOU WISHED YOU WERE DEAD OR WISHED YOU COULD GO TO SLEEP AND NOT WAKE UP?: NO

## 2025-07-29 ASSESSMENT — ENCOUNTER SYMPTOMS
DEPRESSION: 0
OCCASIONAL FEELINGS OF UNSTEADINESS: 0
LOSS OF SENSATION IN FEET: 0

## 2025-07-29 ASSESSMENT — PAIN SCALES - GENERAL
PAINLEVEL_OUTOF10: 6
PAINLEVEL_OUTOF10: 6

## 2025-07-29 ASSESSMENT — LIFESTYLE VARIABLES
HOW OFTEN DO YOU HAVE SIX OR MORE DRINKS ON ONE OCCASION: LESS THAN MONTHLY
HOW OFTEN DO YOU HAVE A DRINK CONTAINING ALCOHOL: MONTHLY OR LESS

## 2025-07-29 ASSESSMENT — PAIN - FUNCTIONAL ASSESSMENT: PAIN_FUNCTIONAL_ASSESSMENT: 0-10

## 2025-07-29 NOTE — Clinical Note
July 29, 2025     Patient: Lili Doe   YOB: 1965   Date of Visit: 7/29/2025       To Whom it May Concern:    Lili Doe was seen in my clinic on 7/29/2025. She {Return to school/sport:25796}.    If you have any questions or concerns, please don't hesitate to call.         Sincerely,          Thong Crouch MD        CC: No Recipients

## 2025-07-29 NOTE — Clinical Note
July 29, 2025     Patient: Lili Doe   YOB: 1965   Date of Visit: 7/29/2025       To Whom It May Concern:    It is my medical opinion that Lili Doe {Work release (duty restriction):30799}.    If you have any questions or concerns, please don't hesitate to call.         Sincerely,        Thong Crouch MD    CC: No Recipients

## 2025-08-11 PROCEDURE — RXMED WILLOW AMBULATORY MEDICATION CHARGE

## 2025-08-15 ENCOUNTER — PHARMACY VISIT (OUTPATIENT)
Dept: PHARMACY | Facility: CLINIC | Age: 60
End: 2025-08-15
Payer: COMMERCIAL

## 2025-08-27 ENCOUNTER — APPOINTMENT (OUTPATIENT)
Facility: CLINIC | Age: 60
End: 2025-08-27
Payer: MEDICARE

## 2025-09-03 ENCOUNTER — PHARMACY VISIT (OUTPATIENT)
Dept: PHARMACY | Facility: CLINIC | Age: 60
End: 2025-09-03
Payer: COMMERCIAL

## 2025-09-03 ENCOUNTER — HOSPITAL ENCOUNTER (EMERGENCY)
Facility: HOSPITAL | Age: 60
Discharge: HOME | End: 2025-09-03
Attending: EMERGENCY MEDICINE
Payer: MEDICARE

## 2025-09-03 ENCOUNTER — APPOINTMENT (OUTPATIENT)
Dept: RADIOLOGY | Facility: HOSPITAL | Age: 60
End: 2025-09-03
Payer: MEDICARE

## 2025-09-03 VITALS
WEIGHT: 250 LBS | BODY MASS INDEX: 35 KG/M2 | RESPIRATION RATE: 18 BRPM | HEIGHT: 71 IN | SYSTOLIC BLOOD PRESSURE: 144 MMHG | TEMPERATURE: 97.9 F | DIASTOLIC BLOOD PRESSURE: 68 MMHG | OXYGEN SATURATION: 100 % | HEART RATE: 73 BPM

## 2025-09-03 DIAGNOSIS — M79.601 PAIN OF RIGHT UPPER EXTREMITY: ICD-10-CM

## 2025-09-03 DIAGNOSIS — M54.2 NECK PAIN: Primary | ICD-10-CM

## 2025-09-03 PROCEDURE — 2500000001 HC RX 250 WO HCPCS SELF ADMINISTERED DRUGS (ALT 637 FOR MEDICARE OP): Performed by: PHYSICIAN ASSISTANT

## 2025-09-03 PROCEDURE — 72050 X-RAY EXAM NECK SPINE 4/5VWS: CPT

## 2025-09-03 PROCEDURE — RXMED WILLOW AMBULATORY MEDICATION CHARGE

## 2025-09-03 PROCEDURE — 72050 X-RAY EXAM NECK SPINE 4/5VWS: CPT | Performed by: RADIOLOGY

## 2025-09-03 PROCEDURE — 99283 EMERGENCY DEPT VISIT LOW MDM: CPT | Performed by: EMERGENCY MEDICINE

## 2025-09-03 RX ORDER — TRAMADOL HYDROCHLORIDE 50 MG/1
50 TABLET, FILM COATED ORAL EVERY 6 HOURS PRN
Qty: 15 TABLET | Refills: 0 | Status: SHIPPED | OUTPATIENT
Start: 2025-09-03 | End: 2025-09-07

## 2025-09-03 RX ORDER — METHYLPREDNISOLONE 4 MG/1
TABLET ORAL
Qty: 21 TABLET | Refills: 0 | Status: SHIPPED | OUTPATIENT
Start: 2025-09-03 | End: 2025-09-10

## 2025-09-03 RX ORDER — IBUPROFEN 600 MG/1
600 TABLET, FILM COATED ORAL ONCE
Status: COMPLETED | OUTPATIENT
Start: 2025-09-03 | End: 2025-09-03

## 2025-09-03 RX ORDER — METHOCARBAMOL 500 MG/1
500 TABLET, FILM COATED ORAL ONCE
Status: COMPLETED | OUTPATIENT
Start: 2025-09-03 | End: 2025-09-03

## 2025-09-03 RX ORDER — TRAMADOL HYDROCHLORIDE 50 MG/1
50 TABLET, FILM COATED ORAL EVERY 6 HOURS PRN
Qty: 15 TABLET | Refills: 0 | Status: SHIPPED | OUTPATIENT
Start: 2025-09-03 | End: 2025-09-03

## 2025-09-03 RX ORDER — ACETAMINOPHEN 325 MG/1
975 TABLET ORAL ONCE
Status: COMPLETED | OUTPATIENT
Start: 2025-09-03 | End: 2025-09-03

## 2025-09-03 RX ORDER — TRAMADOL HYDROCHLORIDE 50 MG/1
50 TABLET, FILM COATED ORAL ONCE
Status: COMPLETED | OUTPATIENT
Start: 2025-09-03 | End: 2025-09-03

## 2025-09-03 RX ORDER — METHOCARBAMOL 500 MG/1
500 TABLET, FILM COATED ORAL 2 TIMES DAILY
Qty: 20 TABLET | Refills: 0 | Status: SHIPPED | OUTPATIENT
Start: 2025-09-03 | End: 2025-09-13

## 2025-09-03 RX ORDER — METHOCARBAMOL 500 MG/1
500 TABLET, FILM COATED ORAL 2 TIMES DAILY
Qty: 20 TABLET | Refills: 0 | Status: SHIPPED | OUTPATIENT
Start: 2025-09-03 | End: 2025-09-03

## 2025-09-03 RX ORDER — METHYLPREDNISOLONE 4 MG/1
TABLET ORAL
Qty: 21 TABLET | Refills: 0 | Status: SHIPPED | OUTPATIENT
Start: 2025-09-03 | End: 2025-09-03

## 2025-09-03 RX ADMIN — ACETAMINOPHEN 975 MG: 325 TABLET ORAL at 11:36

## 2025-09-03 RX ADMIN — TRAMADOL HYDROCHLORIDE 50 MG: 50 TABLET, COATED ORAL at 11:36

## 2025-09-03 RX ADMIN — METHOCARBAMOL 500 MG: 500 TABLET ORAL at 11:35

## 2025-09-03 RX ADMIN — IBUPROFEN 600 MG: 600 TABLET ORAL at 11:36

## 2025-09-03 ASSESSMENT — PAIN - FUNCTIONAL ASSESSMENT
PAIN_FUNCTIONAL_ASSESSMENT: 0-10
PAIN_FUNCTIONAL_ASSESSMENT: 0-10

## 2025-09-03 ASSESSMENT — LIFESTYLE VARIABLES
EVER FELT BAD OR GUILTY ABOUT YOUR DRINKING: NO
EVER HAD A DRINK FIRST THING IN THE MORNING TO STEADY YOUR NERVES TO GET RID OF A HANGOVER: NO
HAVE PEOPLE ANNOYED YOU BY CRITICIZING YOUR DRINKING: NO
HAVE YOU EVER FELT YOU SHOULD CUT DOWN ON YOUR DRINKING: NO
TOTAL SCORE: 0

## 2025-09-03 ASSESSMENT — PAIN SCALES - GENERAL
PAINLEVEL_OUTOF10: 9
PAINLEVEL_OUTOF10: 0 - NO PAIN

## 2026-07-21 ENCOUNTER — APPOINTMENT (OUTPATIENT)
Dept: OPHTHALMOLOGY | Facility: CLINIC | Age: 61
End: 2026-07-21
Payer: MEDICARE